# Patient Record
Sex: FEMALE | Race: WHITE | Employment: OTHER | URBAN - METROPOLITAN AREA
[De-identification: names, ages, dates, MRNs, and addresses within clinical notes are randomized per-mention and may not be internally consistent; named-entity substitution may affect disease eponyms.]

---

## 2019-04-10 ENCOUNTER — APPOINTMENT (OUTPATIENT)
Dept: CT IMAGING | Age: 84
DRG: 149 | End: 2019-04-10
Attending: EMERGENCY MEDICINE
Payer: MEDICARE

## 2019-04-10 ENCOUNTER — HOSPITAL ENCOUNTER (INPATIENT)
Age: 84
LOS: 2 days | Discharge: NURSING, REHAB OR LONG-TERM CARE | DRG: 149 | End: 2019-04-13
Attending: EMERGENCY MEDICINE | Admitting: HOSPITALIST
Payer: MEDICARE

## 2019-04-10 DIAGNOSIS — R27.0 ATAXIA: Primary | ICD-10-CM

## 2019-04-10 DIAGNOSIS — R42 DIZZINESS: ICD-10-CM

## 2019-04-10 LAB
ALBUMIN SERPL-MCNC: 3.4 G/DL (ref 3.5–5)
ALBUMIN/GLOB SERPL: 0.8 {RATIO} (ref 1.1–2.2)
ALP SERPL-CCNC: 56 U/L (ref 45–117)
ALT SERPL-CCNC: 18 U/L (ref 12–78)
ANION GAP SERPL CALC-SCNC: 4 MMOL/L (ref 5–15)
APPEARANCE UR: CLEAR
AST SERPL-CCNC: 24 U/L (ref 15–37)
BACTERIA URNS QL MICRO: NEGATIVE /HPF
BASOPHILS # BLD: 0.1 K/UL (ref 0–0.1)
BASOPHILS NFR BLD: 1 % (ref 0–1)
BILIRUB SERPL-MCNC: 0.3 MG/DL (ref 0.2–1)
BILIRUB UR QL: NEGATIVE
BUN SERPL-MCNC: 25 MG/DL (ref 6–20)
BUN/CREAT SERPL: 25 (ref 12–20)
CALCIUM SERPL-MCNC: 9.3 MG/DL (ref 8.5–10.1)
CAOX CRY URNS QL MICRO: ABNORMAL
CHLORIDE SERPL-SCNC: 102 MMOL/L (ref 97–108)
CO2 SERPL-SCNC: 30 MMOL/L (ref 21–32)
COLOR UR: ABNORMAL
CREAT SERPL-MCNC: 1 MG/DL (ref 0.55–1.02)
DIFFERENTIAL METHOD BLD: ABNORMAL
EOSINOPHIL # BLD: 0.1 K/UL (ref 0–0.4)
EOSINOPHIL NFR BLD: 1 % (ref 0–7)
EPITH CASTS URNS QL MICRO: ABNORMAL /LPF
ERYTHROCYTE [DISTWIDTH] IN BLOOD BY AUTOMATED COUNT: 19.7 % (ref 11.5–14.5)
GLOBULIN SER CALC-MCNC: 4.1 G/DL (ref 2–4)
GLUCOSE SERPL-MCNC: 143 MG/DL (ref 65–100)
GLUCOSE UR STRIP.AUTO-MCNC: NEGATIVE MG/DL
HCT VFR BLD AUTO: 32.3 % (ref 35–47)
HGB BLD-MCNC: 10.3 G/DL (ref 11.5–16)
HGB UR QL STRIP: NEGATIVE
IMM GRANULOCYTES # BLD AUTO: 0 K/UL (ref 0–0.04)
IMM GRANULOCYTES NFR BLD AUTO: 0 % (ref 0–0.5)
KETONES UR QL STRIP.AUTO: NEGATIVE MG/DL
LEUKOCYTE ESTERASE UR QL STRIP.AUTO: ABNORMAL
LIPASE SERPL-CCNC: 85 U/L (ref 73–393)
LYMPHOCYTES # BLD: 0.7 K/UL (ref 0.8–3.5)
LYMPHOCYTES NFR BLD: 7 % (ref 12–49)
MCH RBC QN AUTO: 27.1 PG (ref 26–34)
MCHC RBC AUTO-ENTMCNC: 31.9 G/DL (ref 30–36.5)
MCV RBC AUTO: 85 FL (ref 80–99)
MONOCYTES # BLD: 0.6 K/UL (ref 0–1)
MONOCYTES NFR BLD: 6 % (ref 5–13)
NEUTS SEG # BLD: 8.7 K/UL (ref 1.8–8)
NEUTS SEG NFR BLD: 85 % (ref 32–75)
NITRITE UR QL STRIP.AUTO: NEGATIVE
NRBC # BLD: 0 K/UL (ref 0–0.01)
NRBC BLD-RTO: 0 PER 100 WBC
PH UR STRIP: 7 [PH] (ref 5–8)
PLATELET # BLD AUTO: 212 K/UL (ref 150–400)
PMV BLD AUTO: 10.1 FL (ref 8.9–12.9)
POTASSIUM SERPL-SCNC: 4 MMOL/L (ref 3.5–5.1)
PROT SERPL-MCNC: 7.5 G/DL (ref 6.4–8.2)
PROT UR STRIP-MCNC: ABNORMAL MG/DL
RBC # BLD AUTO: 3.8 M/UL (ref 3.8–5.2)
RBC #/AREA URNS HPF: ABNORMAL /HPF (ref 0–5)
RBC MORPH BLD: ABNORMAL
SODIUM SERPL-SCNC: 136 MMOL/L (ref 136–145)
SP GR UR REFRACTOMETRY: 1.02 (ref 1–1.03)
UA: UC IF INDICATED,UAUC: ABNORMAL
UROBILINOGEN UR QL STRIP.AUTO: 0.2 EU/DL (ref 0.2–1)
WBC # BLD AUTO: 10.2 K/UL (ref 3.6–11)
WBC MORPH BLD: ABNORMAL
WBC URNS QL MICRO: ABNORMAL /HPF (ref 0–4)

## 2019-04-10 PROCEDURE — 83690 ASSAY OF LIPASE: CPT

## 2019-04-10 PROCEDURE — 74011250636 HC RX REV CODE- 250/636

## 2019-04-10 PROCEDURE — 93005 ELECTROCARDIOGRAM TRACING: CPT

## 2019-04-10 PROCEDURE — 74011250636 HC RX REV CODE- 250/636: Performed by: EMERGENCY MEDICINE

## 2019-04-10 PROCEDURE — 96374 THER/PROPH/DIAG INJ IV PUSH: CPT

## 2019-04-10 PROCEDURE — 96361 HYDRATE IV INFUSION ADD-ON: CPT

## 2019-04-10 PROCEDURE — 85025 COMPLETE CBC W/AUTO DIFF WBC: CPT

## 2019-04-10 PROCEDURE — 77030005563 HC CATH URETH INT MMGH -A

## 2019-04-10 PROCEDURE — 81001 URINALYSIS AUTO W/SCOPE: CPT

## 2019-04-10 PROCEDURE — 99285 EMERGENCY DEPT VISIT HI MDM: CPT

## 2019-04-10 PROCEDURE — 96375 TX/PRO/DX INJ NEW DRUG ADDON: CPT

## 2019-04-10 PROCEDURE — 36415 COLL VENOUS BLD VENIPUNCTURE: CPT

## 2019-04-10 PROCEDURE — 80053 COMPREHEN METABOLIC PANEL: CPT

## 2019-04-10 PROCEDURE — 70496 CT ANGIOGRAPHY HEAD: CPT

## 2019-04-10 PROCEDURE — 70450 CT HEAD/BRAIN W/O DYE: CPT

## 2019-04-10 PROCEDURE — 96376 TX/PRO/DX INJ SAME DRUG ADON: CPT

## 2019-04-10 RX ORDER — LORAZEPAM 2 MG/ML
0.5 INJECTION INTRAMUSCULAR
Status: COMPLETED | OUTPATIENT
Start: 2019-04-10 | End: 2019-04-10

## 2019-04-10 RX ORDER — ONDANSETRON 2 MG/ML
4 INJECTION INTRAMUSCULAR; INTRAVENOUS
Status: COMPLETED | OUTPATIENT
Start: 2019-04-10 | End: 2019-04-10

## 2019-04-10 RX ORDER — SODIUM CHLORIDE 0.9 % (FLUSH) 0.9 %
10 SYRINGE (ML) INJECTION
Status: COMPLETED | OUTPATIENT
Start: 2019-04-10 | End: 2019-04-11

## 2019-04-10 RX ORDER — ONDANSETRON 2 MG/ML
INJECTION INTRAMUSCULAR; INTRAVENOUS
Status: COMPLETED
Start: 2019-04-10 | End: 2019-04-10

## 2019-04-10 RX ADMIN — ONDANSETRON 4 MG: 2 INJECTION INTRAMUSCULAR; INTRAVENOUS at 21:51

## 2019-04-10 RX ADMIN — SODIUM CHLORIDE 1000 ML: 900 INJECTION, SOLUTION INTRAVENOUS at 21:10

## 2019-04-10 RX ADMIN — ONDANSETRON HYDROCHLORIDE 4 MG: 2 INJECTION, SOLUTION INTRAMUSCULAR; INTRAVENOUS at 21:51

## 2019-04-10 RX ADMIN — LORAZEPAM 0.5 MG: 2 INJECTION INTRAMUSCULAR; INTRAVENOUS at 21:10

## 2019-04-10 RX ADMIN — ONDANSETRON 4 MG: 2 INJECTION INTRAMUSCULAR; INTRAVENOUS at 19:19

## 2019-04-10 NOTE — ED NOTES
EMS called for patient with nausea vomiting and vertigo that started between 1600 and 1700 today. Family states speech is slow and weak, not slurred. EMS States patient is leaning to the left, family states this is new.  Patient denies pain, states the room is spinning, worse with eyes open .

## 2019-04-10 NOTE — ED NOTES
Assumed care of patient. Patient in no distress, although lethargic. AAOx3. Family at bedside, patient on cardiac monitor. Call bell within reach, side rails up. Transported to CT at this time

## 2019-04-11 ENCOUNTER — APPOINTMENT (OUTPATIENT)
Dept: MRI IMAGING | Age: 84
DRG: 149 | End: 2019-04-11
Attending: HOSPITALIST
Payer: MEDICARE

## 2019-04-11 PROBLEM — R42 DIZZINESS: Status: ACTIVE | Noted: 2019-04-11

## 2019-04-11 PROBLEM — R27.0 ATAXIA: Status: ACTIVE | Noted: 2019-04-11

## 2019-04-11 PROBLEM — R11.2 NAUSEA AND VOMITING: Status: ACTIVE | Noted: 2019-04-11

## 2019-04-11 LAB
ATRIAL RATE: 62 BPM
CALCULATED P AXIS, ECG09: 46 DEGREES
CALCULATED R AXIS, ECG10: 56 DEGREES
CALCULATED T AXIS, ECG11: 65 DEGREES
DIAGNOSIS, 93000: NORMAL
GLUCOSE BLD STRIP.AUTO-MCNC: 100 MG/DL (ref 65–100)
GLUCOSE BLD STRIP.AUTO-MCNC: 105 MG/DL (ref 65–100)
P-R INTERVAL, ECG05: 168 MS
Q-T INTERVAL, ECG07: 442 MS
QRS DURATION, ECG06: 90 MS
QTC CALCULATION (BEZET), ECG08: 448 MS
SERVICE CMNT-IMP: ABNORMAL
SERVICE CMNT-IMP: NORMAL
VENTRICULAR RATE, ECG03: 62 BPM

## 2019-04-11 PROCEDURE — 70551 MRI BRAIN STEM W/O DYE: CPT

## 2019-04-11 PROCEDURE — 97530 THERAPEUTIC ACTIVITIES: CPT

## 2019-04-11 PROCEDURE — 74011250637 HC RX REV CODE- 250/637: Performed by: HOSPITALIST

## 2019-04-11 PROCEDURE — 74011636637 HC RX REV CODE- 636/637: Performed by: HOSPITALIST

## 2019-04-11 PROCEDURE — 74011250636 HC RX REV CODE- 250/636: Performed by: PSYCHIATRY & NEUROLOGY

## 2019-04-11 PROCEDURE — 97530 THERAPEUTIC ACTIVITIES: CPT | Performed by: OCCUPATIONAL THERAPIST

## 2019-04-11 PROCEDURE — 74011636320 HC RX REV CODE- 636/320: Performed by: EMERGENCY MEDICINE

## 2019-04-11 PROCEDURE — 97116 GAIT TRAINING THERAPY: CPT

## 2019-04-11 PROCEDURE — 97166 OT EVAL MOD COMPLEX 45 MIN: CPT | Performed by: OCCUPATIONAL THERAPIST

## 2019-04-11 PROCEDURE — 74011250636 HC RX REV CODE- 250/636: Performed by: EMERGENCY MEDICINE

## 2019-04-11 PROCEDURE — 74011250636 HC RX REV CODE- 250/636: Performed by: HOSPITALIST

## 2019-04-11 PROCEDURE — 65660000000 HC RM CCU STEPDOWN

## 2019-04-11 PROCEDURE — 97162 PT EVAL MOD COMPLEX 30 MIN: CPT

## 2019-04-11 PROCEDURE — 97535 SELF CARE MNGMENT TRAINING: CPT | Performed by: OCCUPATIONAL THERAPIST

## 2019-04-11 PROCEDURE — 82962 GLUCOSE BLOOD TEST: CPT

## 2019-04-11 RX ORDER — ACETAMINOPHEN 650 MG/1
650 SUPPOSITORY RECTAL
Status: DISCONTINUED | OUTPATIENT
Start: 2019-04-11 | End: 2019-04-13 | Stop reason: HOSPADM

## 2019-04-11 RX ORDER — SODIUM CHLORIDE 9 MG/ML
75 INJECTION, SOLUTION INTRAVENOUS CONTINUOUS
Status: DISPENSED | OUTPATIENT
Start: 2019-04-11 | End: 2019-04-12

## 2019-04-11 RX ORDER — LANOLIN ALCOHOL/MO/W.PET/CERES
325 CREAM (GRAM) TOPICAL
Status: DISCONTINUED | OUTPATIENT
Start: 2019-04-12 | End: 2019-04-13 | Stop reason: HOSPADM

## 2019-04-11 RX ORDER — PANTOPRAZOLE SODIUM 40 MG/1
40 TABLET, DELAYED RELEASE ORAL DAILY
Status: DISCONTINUED | OUTPATIENT
Start: 2019-04-11 | End: 2019-04-13 | Stop reason: HOSPADM

## 2019-04-11 RX ORDER — SODIUM CHLORIDE 0.9 % (FLUSH) 0.9 %
5-40 SYRINGE (ML) INJECTION AS NEEDED
Status: DISCONTINUED | OUTPATIENT
Start: 2019-04-11 | End: 2019-04-13 | Stop reason: HOSPADM

## 2019-04-11 RX ORDER — SODIUM CHLORIDE 9 MG/ML
75 INJECTION, SOLUTION INTRAVENOUS CONTINUOUS
Status: DISCONTINUED | OUTPATIENT
Start: 2019-04-11 | End: 2019-04-13 | Stop reason: HOSPADM

## 2019-04-11 RX ORDER — ACETAMINOPHEN 325 MG/1
650 TABLET ORAL
Status: DISCONTINUED | OUTPATIENT
Start: 2019-04-11 | End: 2019-04-13 | Stop reason: HOSPADM

## 2019-04-11 RX ORDER — METHOTREXATE 2.5 MG/1
12.5 TABLET ORAL
COMMUNITY

## 2019-04-11 RX ORDER — MECLIZINE HCL 12.5 MG 12.5 MG/1
25 TABLET ORAL
Status: DISCONTINUED | OUTPATIENT
Start: 2019-04-11 | End: 2019-04-11

## 2019-04-11 RX ORDER — LABETALOL HYDROCHLORIDE 5 MG/ML
5 INJECTION, SOLUTION INTRAVENOUS
Status: DISCONTINUED | OUTPATIENT
Start: 2019-04-11 | End: 2019-04-13 | Stop reason: HOSPADM

## 2019-04-11 RX ORDER — ASCORBIC ACID 500 MG
500 TABLET ORAL DAILY
Status: DISCONTINUED | OUTPATIENT
Start: 2019-04-11 | End: 2019-04-13 | Stop reason: HOSPADM

## 2019-04-11 RX ORDER — LANOLIN ALCOHOL/MO/W.PET/CERES
325 CREAM (GRAM) TOPICAL
COMMUNITY

## 2019-04-11 RX ORDER — AMLODIPINE BESYLATE 5 MG/1
5 TABLET ORAL DAILY
COMMUNITY

## 2019-04-11 RX ORDER — MECLIZINE HCL 12.5 MG 12.5 MG/1
12.5 TABLET ORAL 3 TIMES DAILY
Status: DISCONTINUED | OUTPATIENT
Start: 2019-04-11 | End: 2019-04-13 | Stop reason: HOSPADM

## 2019-04-11 RX ORDER — FOLIC ACID 1 MG/1
1 TABLET ORAL DAILY
Status: DISCONTINUED | OUTPATIENT
Start: 2019-04-11 | End: 2019-04-13 | Stop reason: HOSPADM

## 2019-04-11 RX ORDER — PREDNISONE 5 MG/1
2.5 TABLET ORAL DAILY
Status: DISCONTINUED | OUTPATIENT
Start: 2019-04-11 | End: 2019-04-13 | Stop reason: HOSPADM

## 2019-04-11 RX ORDER — PREDNISONE 2.5 MG/1
2.5 TABLET ORAL DAILY
COMMUNITY

## 2019-04-11 RX ORDER — FERROUS SULFATE, DRIED 160(50) MG
2 TABLET, EXTENDED RELEASE ORAL 2 TIMES DAILY WITH MEALS
Status: DISCONTINUED | OUTPATIENT
Start: 2019-04-11 | End: 2019-04-13 | Stop reason: HOSPADM

## 2019-04-11 RX ORDER — SODIUM CHLORIDE 0.9 % (FLUSH) 0.9 %
5-40 SYRINGE (ML) INJECTION EVERY 8 HOURS
Status: DISCONTINUED | OUTPATIENT
Start: 2019-04-11 | End: 2019-04-13 | Stop reason: HOSPADM

## 2019-04-11 RX ORDER — ONDANSETRON 2 MG/ML
4 INJECTION INTRAMUSCULAR; INTRAVENOUS
Status: DISCONTINUED | OUTPATIENT
Start: 2019-04-11 | End: 2019-04-13 | Stop reason: HOSPADM

## 2019-04-11 RX ORDER — ASPIRIN 81 MG/1
81 TABLET ORAL DAILY
Status: DISCONTINUED | OUTPATIENT
Start: 2019-04-11 | End: 2019-04-13 | Stop reason: HOSPADM

## 2019-04-11 RX ADMIN — Medication 10 ML: at 15:39

## 2019-04-11 RX ADMIN — OXYCODONE HYDROCHLORIDE AND ACETAMINOPHEN 500 MG: 500 TABLET ORAL at 17:20

## 2019-04-11 RX ADMIN — ONDANSETRON 4 MG: 2 INJECTION INTRAMUSCULAR; INTRAVENOUS at 08:52

## 2019-04-11 RX ADMIN — Medication 10 ML: at 00:17

## 2019-04-11 RX ADMIN — OYSTER SHELL CALCIUM WITH VITAMIN D 2 TABLET: 500; 200 TABLET, FILM COATED ORAL at 17:20

## 2019-04-11 RX ADMIN — SODIUM CHLORIDE 75 ML/HR: 900 INJECTION, SOLUTION INTRAVENOUS at 20:38

## 2019-04-11 RX ADMIN — PREDNISONE 2.5 MG: 5 TABLET ORAL at 15:40

## 2019-04-11 RX ADMIN — MECLIZINE 12.5 MG: 12.5 TABLET ORAL at 17:20

## 2019-04-11 RX ADMIN — SODIUM CHLORIDE 75 ML/HR: 900 INJECTION, SOLUTION INTRAVENOUS at 01:31

## 2019-04-11 RX ADMIN — SODIUM CHLORIDE 75 ML/HR: 900 INJECTION, SOLUTION INTRAVENOUS at 08:47

## 2019-04-11 RX ADMIN — MULTIPLE VITAMINS W/ MINERALS TAB 1 TABLET: TAB at 15:39

## 2019-04-11 RX ADMIN — Medication 10 ML: at 21:18

## 2019-04-11 RX ADMIN — IOPAMIDOL 100 ML: 755 INJECTION, SOLUTION INTRAVENOUS at 00:17

## 2019-04-11 RX ADMIN — MULTIPLE VITAMINS W/ MINERALS TAB 1 TABLET: TAB at 17:24

## 2019-04-11 RX ADMIN — MECLIZINE 12.5 MG: 12.5 TABLET ORAL at 21:18

## 2019-04-11 RX ADMIN — ASPIRIN 81 MG: 81 TABLET, COATED ORAL at 08:45

## 2019-04-11 RX ADMIN — Medication 10 ML: at 08:52

## 2019-04-11 RX ADMIN — PANTOPRAZOLE SODIUM 40 MG: 40 TABLET, DELAYED RELEASE ORAL at 08:45

## 2019-04-11 NOTE — PROGRESS NOTES
Problem: Self Care Deficits Care Plan (Adult) Goal: *Acute Goals and Plan of Care (Insert Text) Description Occupational Therapy Goals: 
Initiated 4/11/2019 1. Patient will perform grooming standing with contact guard assist within 7 days. 2. Patient will perform donning of underwear and pants with minimal assistance within 7 days. 3. Patient will perform toileting with minimal assistance within 7 days. 4. Patient will perform bathing with minimal assist within 7 days. 5. Patient will improve dynamic sitting balance to supervision within 7 days. 6. Patient will transfer from toilet with contact guard assist using the least restrictive device and appropriate durable medical equipment within 7 days. Outcome: Progressing Towards Goal 
 
OCCUPATIONAL THERAPY EVALUATION Patient: Aga Steward (69 y.o. female) Date: 4/11/2019 Primary Diagnosis: Nausea and vomiting [R11.2] Dizziness [R42] Ataxia [R27.0] Precautions: fall ASSESSMENT : 
Based on the objective data described below, the patient presents with dizziness with head turns to the right and did have nystagmus when pt perform alicia hallpike and did have report of vertical double vision when laying on right side and looking down. MRI was negative but pt was noted to have a right droopy eyelid at times and family reported that pt slurs her words when she first wakes up. Heavy lean to the left seated and with all mobility attempts. Moderate assist x2 for ADL mobility and to mobilize around stretcher to toilet. Episode of urinary incontinence but pt was aware. Seated on commode pt needed consistent min to moderate assist to remain seated upright statically and moderate assist with attempting to wipe. Total assist for gown management and pt was unable to stand at sink to wash hands as pt had dry heaves. Pt is performing UB ADLS at a supervision to max assist level and lower body ADLS at a max/total assist level. Patient will benefit from skilled intervention to address the above impairments. Patient?s rehabilitation potential is considered to be Good Factors which may influence rehabilitation potential include: ? None noted ? Mental ability/status ? Medical condition ? Home/family situation and support systems ? Safety awareness ? Pain tolerance/management ? Other: PLAN : 
Recommendations and Planned Interventions: 
?               Self Care Training                  ? Therapeutic Activities ? Functional Mobility Training    ? Cognitive Retraining 
? Therapeutic Exercises           ? Endurance Activities ? Balance Training                   ? Neuromuscular Re-Education ? Visual/Perceptual Training     ? Home Safety Training 
? Patient Education                 ? Family Training/Education ? Other (comment): Frequency/Duration: Patient will be followed by occupational therapy 5 times a week to address goals. Discharge Recommendations: Inpatient Rehab Further Equipment Recommendations for Discharge: TBD SUBJECTIVE:  
Patient stated ? I don't mind working with you. You have been so helpful. ? OBJECTIVE DATA SUMMARY:  
HISTORY:  
Past Medical History:  
Diagnosis Date Cancer ([facility] [address] 08/2004  
 lymphoma Nausea & vomiting Rheumatoid arthritis(714.0) Past Surgical History:  
Procedure Laterality Date [address] HX CATARACT REMOVAL  8224,[id number] [address] HX HEENT  2005  
 rt tube in ear removed in 06 HX HERNIA REPAIR  0265  
 umbilical  
 HX ORTHOPAEDIC  6/84  
 hip replacement,lt HX ORTHOPAEDIC  1/88  
 bilateral knee replacements HX ORTHOPAEDIC  2008  
 bunionectomy rt. 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  
 
Home Situation Home Environment: Private residence Wheelchair Ramp: Yes One/Two Story Residence: One story Living Alone: Yes Support Systems: Child(aditya) Patient Expects to be Discharged to[de-identified] Private residence Current DME Used/Available at Home: Cane, straight, Walker(has but does not use) Tub or Shower Type: Shower Hand dominance: Right EXAMINATION OF PERFORMANCE DEFICITS: 
Cognitive/Behavioral Status: 
Neurologic State: Alert Orientation Level: Oriented X4 Cognition: Follows commands Perception: Appears intact Perseveration: No perseveration noted Safety/Judgement: Awareness of environment; Fall prevention;Home safety Hearing: Auditory Auditory Impairment: Hard of hearing, bilateral 
 
Vision/Perceptual:   
    
    
    
  
Diplopia: Yes(while laying on right side/looking down side by side) Acuity: (corrected with glasses) Range of Motion: 
AROM: Within functional limits(WFL LEs; B shoulders/hands with chronic limitations) Strength: 
Strength: Generally decreased, functional(LE equal bilaterally) Coordination: 
Coordination: Generally decreased, functional(severe arthritis in bilateral hands) Fine Motor Skills-Upper: Left Impaired;Right Impaired(due to deformities from arthitis bilateral hands) Gross Motor Skills-Upper: Left Impaired;Right Intact(left impaired due to arthritis) Tone & Sensation: 
Tone: Normal 
Sensation: Intact Balance: 
Sitting: Impaired Sitting - Static: Poor (constant support)(poor to fair but with L lean; corrects but w/ cues) Sitting - Dynamic: Poor (constant support)(severe L lean) Standing: Impaired Standing - Static: Poor(severe L lean and forward flexion) Standing - Dynamic : Poor(severe L lean and forward flexion) Functional Mobility and Transfers for ADLs: 
Bed Mobility: 
Supine to Sit: Moderate assistance;Assist x2 Sit to Supine: Moderate assistance;Assist x2 Transfers: 
Sit to Stand: Minimum assistance;Assist x2 Stand to Sit: Minimum assistance; Moderate assistance;Assist x2 Bed to Chair: Moderate assistance;Assist x2 Bathroom Mobility: Moderate assistance(x2 veering to the right, very ataxi) Toilet Transfer : Moderate assistance; Additional time;Assist x2(veering to the right) ADL Assessment: 
Feeding: Supervision Oral Facial Hygiene/Grooming: Minimum assistance Bathing: Maximum assistance Upper Body Dressing: Maximum assistance Lower Body Dressing: Total assistance Toileting: Maximum assistance; Total assistance ADL Intervention and task modifications: 
See assessment Cognitive Retraining Safety/Judgement: Awareness of environment; Fall prevention;Home safety Functional Measure: 
Barthel Index: 
Bathin Bladder: 5 Bowels: 10 
Groomin Dressin Feedin Mobility: 0 Stairs: 0 Toilet Use: 0 Transfer (Bed to Chair and Back): 5 Total: 30/100 Percentage of impairment  
0% 1-19% 20-39% 40-59% 60-79% 80-99% 100% Barthel Score 0-100 100 99-80 79-60 59-40 20-39 1-19 
 0 The Barthel ADL Index: Guidelines 1. The index should be used as a record of what a patient does, not as a record of what a patient could do. 2. The main aim is to establish degree of independence from any help, physical or verbal, however minor and for whatever reason. 3. The need for supervision renders the patient not independent. 4. A patient's performance should be established using the best available evidence. Asking the patient, friends/relatives and nurses are the usual sources, but direct observation and common sense are also important. However direct testing is not needed. 5. Usually the patient's performance over the preceding 24-48 hours is important, but occasionally longer periods will be relevant. 6. Middle categories imply that the patient supplies over 50 per cent of the effort. 7. Use of aids to be independent is allowed. Radha Diaz., Barthel, D.W. (3386). Functional evaluation: the Barthel Index. [facility] (14)2. Slime [place] alexis JAIMIE ReevesF, Adryan Luo., [facility] [place], [address] (1999). Measuring the change indisability after inpatient rehabilitation; comparison of the responsiveness of the Barthel Index and Functional [place] Measure. Journal of Neurology, Neurosurgery, and Psychiatry, 66(4), 973-427. Indu Michaels, N.J.A, SARAI Ley, & William Knight M.A. (2004.) Assessment of post-stroke quality of life in cost-effectiveness studies: The usefulness of the Barthel Index and the EuroQoL-5D. [facility], 13, 577-40 Occupational Therapy Evaluation Charge Determination History Examination Decision-Making MEDIUM Complexity : Expanded review of history including physical, cognitive and psychosocial  history  MEDIUM Complexity : 3-5 performance deficits relating to physical, cognitive , or psychosocial skils that result in activity limitations and / or participation restrictions HIGH Complexity : Patient presents with comorbidities that affect occupational performance. Signifigant modification of tasks or assistance (eg, physical or verbal) with assessment (s) is necessary to enable patient to complete evaluation Based on the above components, the patient evaluation is determined to be of the following complexity level: HIGH Pain: 
Pain Scale 1: Numeric (0 - 10) Pain Intensity 1: 0 Activity Tolerance:  
Please refer to the flowsheet for vital signs taken during this treatment. After treatment:  
? Patient left in no apparent distress sitting up in chair ? Patient left in no apparent distress in bed 
? Call bell left within reach ? Nursing notified ? Caregiver present ? Bed alarm activated COMMUNICATION/EDUCATION:  
The patient?s plan of care was discussed with: Physical Therapist, Registered Nurse and patient and her family . ? Home safety education was provided and the patient/caregiver indicated understanding. ? Patient/family have participated as able in goal setting and plan of care. ? Patient/family agree to work toward stated goals and plan of care. ? Patient understands intent and goals of therapy, but is neutral about his/her participation. ? Patient is unable to participate in goal setting and plan of care. This patient?s plan of care is appropriate for delegation to [facility]. Thank you for this referral. 
Donato Trammell OTR/L Time Calculation: 35 mins

## 2019-04-11 NOTE — PROGRESS NOTES
* No surgery found * 
* No surgery found * Bedside shift change report given to  Liban Maciel RN(oncoming nurse) by Divya Mendoza (offgoing nurse). Report included the following information SBAR and Kardex. Zone Phone:   [phone] Significant changes during shift:   
 
 
Patient Information Tretha Fuel 80 y.o. 
4/10/2019  6:41 PM by Kenneth Avila MD. Tretha Fuel was admitted from Home 
 
Problem List 
 
Patient Active Problem List  
 Diagnosis Date Noted  Dizziness 04/11/2019  Ataxia 04/11/2019  Nausea and vomiting 04/11/2019 Past Medical History:  
Diagnosis Date  Cancer ([facility] [address] 08/2004  
 lymphoma  Nausea & vomiting  Rheumatoid arthritis(714.0) Core Measures: CVA: No Yes CHF:No No 
PNA:No No 
 
Post Op Surgical (If Applicable):  
 
Number times ambulated in hallway past shift:  0 Number of times OOB to chair past shift:   0 
NG Tube: No 
Incentive Spirometer: No 
Drains: No   Volume  0 Dressing Present:  No 
Flatus:  No 
 
Activity Status: OOB to Chair No 
Ambulated this shift No  
Bed Rest Yes Supplemental O2: (If Applicable) NC No 
NRB No 
Venti-mask No 
On 0 Liters/min LINES AND DRAINS: 
Central Line? No Placement date 0 Reason Medically Necessary 0 
 
 
PICC LINE? No Placement date 0Reason Medically Necessary 0 Urinary Catheter? No Placement Date 0 Reason Medically Necessary 0 
 
 
DVT prophylaxis: DVT prophylaxis Med- Yes DVT prophylaxis SCD or PHUC- Yes Wounds: (If Applicable) Wounds- No 
 
Location 0 Patient Safety: 
 
Falls Score Total Score: 2 Safety Level_______ Bed Alarm On? Yes Sitter? No 
 
Plan for upcoming shift: needs PT/OT MRI, ECHO, NEURO CONSULT Discharge Plan: No 0 Active Consults: 
IP CONSULT TO NEUROLOGY

## 2019-04-11 NOTE — PROGRESS NOTES
Problem: Mobility Impaired (Adult and Pediatric) Goal: *Acute Goals and Plan of Care (Insert Text) Description Physical Therapy Goals Initiated 4/11/2019 1. Patient will move from supine to sit and sit to supine , scoot up and down and roll side to side in bed with minimal assistance/contact guard assist within 7 day(s). 2.  Patient will transfer from bed to chair and chair to bed with minimal assistance/contact guard assist using the least restrictive device within 7 day(s). 3.  Patient will perform sit to stand with minimal assistance/contact guard assist within 7 day(s). 4.  Patient will ambulate with minimal assistance/contact guard assist for 75 feet with the least restrictive device within 7 day(s). Outcome: Progressing Towards Goal 
 PHYSICAL THERAPY EVALUATION Patient: Luna Solis (50 y.o. female) Date: 4/11/2019 Primary Diagnosis: Nausea and vomiting [R11.2] Dizziness [R42] Ataxia [R27.0] Precautions: fall - poor balance sitting and standing with L lean ASSESSMENT : 
Based on the objective data described below, the patient presents with severe decline in function from her relatively independent baseline in all mobility, gait and balance. Cleared for session by RN. Pt lives alone in one story home with ramp entry. She is normally ambulatory without device independently and completes her own ADLs except for shoes and compression socks with which her son helps her in the mornings. Pt has remained active and states she did aquatic therapy/exercise up until she was [de-identified] yo. At this time, pt received in bed. Family notes some change to L side of face and slurred speech when pt wakes up from napping here in hospital. Pt is alert and Ox4. She c/o feeling off balance whenever she moves, does not expressly describe spinning dizziness. Pt is unable to tolerate more than a modified alicia hallpike but does report more sxs to the L.  Progressed through a modified epley with eval of bed mobility and pt describing more double vision than oscillopsia definitively describing 2 of objects not moving objects. She does show min nystagmus (horiz) with L [place]. Pt is mod A of 2 for her bed mobility. She shows poor sitting balance with moderate L lean and mod A to initially maintain sitting. She perceives L lean and is able to initiate correction but is unable to perceive if she has attained midline. Once cued, pt is able to maintain midline with CGA. She requires min A of 2 to stand but then mod A of 2 for any movement and amb with HHA with severe L lean and forward flexion. Pt also exhibiting intermittent L lean when sitting on toilet. Pt mod A of 2 amb back to bed with same lean and forward flexion and with nausea and dry heave. Pt returned to bed with mod A of 2. Call bell in reach and RN notified. Given severe and sudden nature of deficits well below her independent baseline along with need for extensive balance and vestibular therapy, would highly recommend inpatient rehab at d/c. Pt is motivated to return to independence and shows good potential to respond to intensive therapy. Patient will benefit from skilled intervention to address the above impairments. Patient?s rehabilitation potential is considered to be excellent Factors which may influence rehabilitation potential include:  
? None noted ? Mental ability/status ? Medical condition ? Home/family situation and support systems ? Safety awareness 
? Pain tolerance/management 
? Other: PLAN : 
Recommendations and Planned Interventions: 
?           Bed Mobility Training             ? Neuromuscular Re-Education ? Transfer Training                   ? Orthotic/Prosthetic Training 
? Gait Training                         ? Modalities ?           Therapeutic Exercises           ? Edema Management/Control ? Therapeutic Activities            ? Patient and Family Training/Education ? Other (comment): Frequency/Duration: Patient will be followed by physical therapy  5 times a week to address goals. Discharge Recommendations: Inpatient Rehab Further Equipment Recommendations for Discharge: TBD SUBJECTIVE:  
Patient stated ? Am I at the middle? .? OBJECTIVE DATA SUMMARY:  
HISTORY:   
Past Medical History:  
Diagnosis Date Cancer ([address] 08/2004  
 lymphoma Nausea & vomiting Rheumatoid arthritis(714.0) Past Surgical History:  
Procedure Laterality Date [address] [facility] HX CATARACT REMOVAL  7595,1835 [facility] HX HEENT  2005  
 rt tube in ear removed in 06 HX HERNIA REPAIR  6738  
 umbilical  
 HX ORTHOPAEDIC  6/84  
 hip replacement,lt HX ORTHOPAEDIC  1/88  
 bilateral knee replacements HX ORTHOPAEDIC  2008  
 bunionectomy rt. R Kavita [address] Prior Level of Function/Home Situation: Pt lives alone in one story home with ramp entry. She is normally ambulatory without device independently and completes her own ADLs except for shoes and compression socks with which her son helps her in the mornings. Pt has remained active and states she did aquatic therapy/exercise up until she was [de-identified] yo. Home Situation Home Environment: Private residence Wheelchair Ramp: Yes One/Two Story Residence: One story Living Alone: Yes Current DME Used/Available at Home: (life alert) Tub or Shower Type: Shower EXAMINATION/PRESENTATION/DECISION MAKING:  
Critical Behavior: 
Neurologic State: Alert Orientation Level: Oriented X4 Cognition: Follows commands Range Of Motion: 
AROM: Within functional limits(WFL LEs; B shoulders/hands with chronic limitations) Strength:   
 Strength: Generally decreased, functional(LE equal bilaterally) Tone & Sensation:  
Tone: Normal 
  
  
  
  
Sensation: Intact Coordination: 
Coordination: (decr B hands with RA changes; intact rep mvt at feet) Functional Mobility: 
Bed Mobility: 
  
Supine to Sit: Moderate assistance;Assist x2 Sit to Supine: Moderate assistance;Assist x2 Transfers: 
Sit to Stand: Minimum assistance;Assist x2 Stand to Sit: Minimum assistance; Moderate assistance;Assist x2 Bed to Chair: Moderate assistance;Assist x2 Balance:  
Sitting: Impaired Sitting - Static: Poor (constant support)(poor to fair but with L lean; corrects but w/ cues) Sitting - Dynamic: Poor (constant support)(severe L lean) Standing: Impaired Standing - Static: Poor(severe L lean and forward flexion) Standing - Dynamic : Poor(severe L lean and forward flexion) Ambulation/Gait Training: 
Distance (ft): 25 Feet (ft)(x2 to and from bathroom) Assistive Device: Gait belt(HHA of 2 with support at trunk) Ambulation - Level of Assistance: Moderate assistance;Assist x2 Gait Abnormalities: Decreased step clearance; Path deviations;Trunk sway increased(severe L lean and forward flexion) Base of Support: Widened Speed/Margaux: Slow;Pace decreased (<100 feet/min) Step Length: Right shortened;Left shortened Functional Measure: 
Claudette Griffon Balance Test: 
 
Sitting to Standin Standing Unsupported: 0 Sitting with Back Unsupported: 1(with cues) Standing to Sittin Transfers: 0 Standing Unsupported with Eyes Closed: 0 Standing Unsupported with Feet Together: 0 Reach Forward with Outstretched Arm: 0  Object: 0 Turn to Look Over Shoulders: 0 Turn 360 Degrees: 0 Alternate Foot on Step/Stool: 0 Standing Unsupported One Foot in Front: 0 Stand on One Le Total: 1 
 
 
56=Maximum possible score;  
0-20=High fall risk 21-40=Moderate fall risk 41-56=Low fall risk Physical Therapy Evaluation Charge Determination History Examination Presentation Decision-Making HIGH Complexity :3+ comorbidities / personal factors will impact the outcome/ POC  HIGH Complexity : 4+ Standardized tests and measures addressing body structure, function, activity limitation and / or participation in recreation  MEDIUM Complexity : Evolving with changing characteristics  MEDIUM Complexity : FOTO score of 26-74 Based on the above components, the patient evaluation is determined to be of the following complexity level: MEDIUM Pain: 
Pain Scale 1: Numeric (0 - 10) Pain Intensity 1: 0 Activity Tolerance:  
See above narrative Please refer to the flowsheet for vital signs taken during this treatment. After treatment:  
?         Patient left in no apparent distress sitting up in chair ? Patient left in no apparent distress in bed 
? Call bell left within reach ? Nursing notified ? Caregiver present ? Bed alarm activated COMMUNICATION/EDUCATION:  
The patient?s plan of care was discussed with: Occupational Therapist and Registered Nurse. ?         Fall prevention education was provided and the patient/caregiver indicated understanding. ? Patient/family have participated as able in goal setting and plan of care. ?         Patient/family agree to work toward stated goals and plan of care. ?         Patient understands intent and goals of therapy, but is neutral about his/her participation. ? Patient is unable to participate in goal setting and plan of care. Thank you for this referral. 
Jeremy Galvan, PT Time Calculation: 36 mins

## 2019-04-11 NOTE — ED NOTES
Unable to perform PO challenge or ambulate patient due to patients lethargy. Returned from CT scan, placed back on monitor

## 2019-04-11 NOTE — PROGRESS NOTES
Physical Therapy Received PT eval order. Pt seen for eval. PT with severe decline in function. Mod A of 2 for transfer and amb with severe L lean. +Double vision, consistent nausea and dry heaves. Highly recommend inpatient rehab on d/c. Full report to follow.  
 
Marletta Cheadle, PT

## 2019-04-11 NOTE — PROGRESS NOTES
Patient admitted from ED to room 3109. Patient resting in  bed comfortably head to toes assessment are as charted. Patient denies pain a this time. Pain assessment on going. Falls prevention maintained. Care plan reviewed and patient expressed understanding. Call light and belongings within reach. Will continue to monitor.

## 2019-04-11 NOTE — PROGRESS NOTES
Bedside shift change report given to [address] (oncoming nurse) by Hansel Fontaine RN (offgoing nurse). Report included the following information SBAR and Kardex.

## 2019-04-11 NOTE — H&P
Hospitalist Admission NoteNAME: Sami Robert :  1935 MRN:  [id number] Date/Time:  2019 7:04 AM 
 
Patient PCP: Lexie Mcelroy MD  
Rheum:  Dr. Christina Sis GI:  Dr. Shawanda Jack 
______________________________________________________________________ Assessment & Plan: 
Dizziness, truncal ataxia with leaning to left, nausea and vomiting 
--concerning for acute posterior stroke vs vertigo. Patient had nystagmus on ER eval, not appreciated now. 
--CT head and CTA head and neck negative. --start empiric coated aspirin. If MRI negative, will discontinue since she has had gastritis and LGIB. --empiric meclizine prn 
--pt/ot consult. Neurology consult, echo, A1c, lipid panel HTN 
--hold amlodipine to allow permissive HTN Severe RA 
--continue prednisone 2.5mg daily and MTX and folic acid Gastritis Hx LGIB Anemia 
--continue iron. Had EGD/colonoscopy a few months ago per patient. --continue PPI 
--hgb 10, was 8 few months ago There is no height or weight on file to calculate BMI. Code: full DVT prophylaxis:  SCD Surrogate decision maker:  Grant Elkins Subjective: CHIEF COMPLAINT:  Dizziness, leaning to left, thick tongued speech, n/v 
 
HISTORY OF PRESENT ILLNESS:    
Sami Robert is a 80 y.o.  female with severe RA, HTN onset 5pm with dizziness, gait instability, having to hold onto wall. Soon followed with severe nausea and vomiting, generalized weakness. Dizziness better if close eyes. Denies headache, focal weakness. Dizziness better now but still dizzy when sit up. No CP, SOB, abdominal pain, diarrhea. Denies any bleeding, stool always dark. We were asked to admit for work up and evaluation of the above problems. Past Medical History:  
Diagnosis Date  Cancer ([facility] [address] 2004  
 lymphoma  Nausea & vomiting  Rheumatoid arthritis(714.0) Past Surgical History:  
Procedure Laterality Date [facility]  HX CATARACT REMOVAL  [id number]  HX HEENT  2005  
 rt tube in ear removed in [address]  
 umbilical  
 HX ORTHOPAEDIC  6/84  
 hip replacement,lt  HX ORTHOPAEDIC  1/88  
 bilateral knee replacements  HX ORTHOPAEDIC  2008  
 bunionectomy [place]. [id number] [place] [address] Social History Tobacco Use  Smoking status: Never Smoker  Smokeless tobacco: Never Used Substance Use Topics  Alcohol use: No  
Lives alone, ambulate independent, used a walker when RA had flared up a few months ago Family History Problem Relation Age of Onset  Delayed Awakening Sister   
     took 2 days  Cancer Mother  Heart Disease Mother  Diabetes Father Allergies Allergen Reactions  Clinoril [Sulindac] Swelling Prior to Admission medications Medication Sig Start Date End Date Taking? Authorizing Provider  
hydrocodone-acetaminophen (VICODIN) 5-500 mg per tablet Take 1-2 Tabs by mouth every four (4) hours as needed for Pain. 1/25/11   Erika Presley MD  
folic acid (FOLVITE) 1 mg tablet Take 1 mg by mouth daily. Provider, Historical  
Calcium-Cholecalciferol, D3, (CALCIUM 600 WITH VITAMIN D3) 600 mg(1,500mg) -400 unit Chew Take 1 Tab by mouth two (2) times a day. 1/10/11   Provider, Historical  
ascorbic acid (VITAMIN C) 500 mg tablet Take 500 mg by mouth two (2) times a day. 1/10/11   Provider, Historical  
omega-3 fatty acids-vitamin e (FISH OIL) 1,000 mg Cap Take 1 Cap by mouth two (2) times a day. 1/10/11   Provider, Historical  
Ferrous Sulfate (IRON) 27 mg (Iron) Tab Take 1 Tab by mouth daily. 1/10/11   Provider, Historical  
aspirin 81 mg tablet Take 81 mg by mouth daily. 1/10/11   Provider, Historical  
MULTIVITS W-FE,OTHER MIN (CENTRUM PO) Take 1 Tab by mouth daily.  1/10/11   Provider, Historical  
 fiber Cap Take 1 Tab by mouth two (2) times a day. 1/10/11   Provider, Historical  
 
REVIEW OF SYSTEMS:  POSITIVE= Bold. Negative = normal text General:  fever, chills, sweats, generalized weakness, weight loss/gain, loss of appetite Eyes:  blurred vision, eye pain, loss of vision, diplopia Ear Nose and Throat:  rhinorrhea, pharyngitis Respiratory:   cough, sputum production, SOB, wheezing, MCCONNELL, pleuritic pain 
Cardiology:  chest pain, palpitations, orthopnea, PND, edema, syncope Gastrointestinal:  abdominal pain, N/V, dysphagia, diarrhea, constipation, bleeding with bloody stool x 1 week a few months ago Genitourinary:  frequency, urgency, dysuria, hematuria, incontinence Muskuloskeletal :  arthralgia, myalgia Hematology:  easy bruising, bleeding, lymphadenopathy Dermatological:  rash, ulceration, pruritis Endocrine:  hot flashes or polydipsia Neurological:  headache, dizziness, confusion, focal weakness, paresthesia, memory loss, gait disturbance Psychological: anxiety, depression, agitation Objective: VITALS:   
Visit Vitals /44 Pulse 64 Temp 97.5 °F (36.4 °C) Resp 9 SpO2 96% Temp (24hrs), Av.7 °F (36.5 °C), Min:97.5 °F (36.4 °C), Max:97.8 °F (36.6 °C) There is no height or weight on file to calculate BMI. PHYSICAL EXAM: 
 
General:    Alert, cooperative, no distress, appears stated age. HEENT: Atraumatic, anicteric sclerae, pink conjunctivae No oral ulcers, mucosa dry, cavity in left upper back molar, throat clear. Hearing intact. Neck:  Supple, symmetrical,  thyroid: non tender Lungs:   Clear to auscultation bilaterally. No Wheezing or Rhonchi. No rales. Chest wall:  No tenderness  No Accessory muscle use. Heart:   Regular  rhythm,  No  murmur   No gallop. No edema. Abdomen:   Soft, non-tender. Not distended. Bowel sounds normal. No masses Extremities: No cyanosis. No clubbing. Severe arthritis deformities fingers and hands and left toes Skin:     Not pale Not Jaundiced  No rashes Psych:  Good insight. Not depressed. Not anxious or agitated. Neurologic: EOMs intact. No facial asymmetry. No aphasia or slurred speech. Left arm slightly weaker than right, no pronator drift. Legs 5/5. Finger to nose intact, Alert and oriented X 3. Peripheral pulse: Bilateral, DP, 2+ Capillary refill:  normal 
 
IMAGING RESULTS: 
 [x]       I have personally reviewed the actual   []     CXR  []     CT scan CXR: 
CT : 
EKG: SR 62, no ST T abnormality 
 ________________________________________________________________________ Care Plan discussed with: 
  Comments Patient y Family  y Bertrum Nell and granddaughter [place] Cava bedside RN Care Manager Consultant:     
________________________________________________________________________ Prophylaxis: 
GI PPI  
DVT SCD  
________________________________________________________________________ Recommended Disposition:  
Home with Family y HH/PT/OT/RN y  
SNF/LTC   
HERB   
________________________________________________________________________ Code Status: 
Full Code y  
DNR/DNI   
________________________________________________________________________ TOTAL TIME: 60 minutes 
 
 
______________________________________________________________________ Rickie Witt MD 
 
 
Procedures: see electronic medical records for all procedures/Xrays and details which were not copied into this note but were reviewed prior to creation of Plan. LAB DATA REVIEWED:   
Recent Results (from the past 24 hour(s)) EKG, 12 LEAD, INITIAL Collection Time: 04/10/19  6:52 PM  
Result Value Ref Range Ventricular Rate 62 BPM  
 Atrial Rate 62 BPM  
 P-R Interval 168 ms QRS Duration 90 ms Q-T Interval 442 ms QTC Calculation (Bezet) 448 ms Calculated P Axis 46 degrees Calculated R Axis 56 degrees Calculated T Axis 65 degrees Diagnosis Normal sinus rhythm Normal ECG 
 When compared with ECG of 10-GIANNA-2011 12:10, 
Questionable change in QRS axis CBC WITH AUTOMATED DIFF Collection Time: 04/10/19  7:20 PM  
Result Value Ref Range WBC 10.2 3.6 - 11.0 K/uL  
 RBC 3.80 3.80 - 5.20 M/uL  
 HGB 10.3 (L) 11.5 - 16.0 g/dL HCT 32.3 (L) 35.0 - 47.0 % MCV 85.0 80.0 - 99.0 FL  
 MCH 27.1 26.0 - 34.0 PG  
 MCHC 31.9 30.0 - 36.5 g/dL  
 RDW 19.7 (H) 11.5 - 14.5 % PLATELET 434 652 - 488 K/uL MPV 10.1 8.9 - 12.9 FL  
 NRBC 0.0 0  WBC ABSOLUTE NRBC 0.00 0.00 - 0.01 K/uL NEUTROPHILS 85 (H) 32 - 75 % LYMPHOCYTES 7 (L) 12 - 49 % MONOCYTES 6 5 - 13 % EOSINOPHILS 1 0 - 7 % BASOPHILS 1 0 - 1 % IMMATURE GRANULOCYTES 0 0.0 - 0.5 % ABS. NEUTROPHILS 8.7 (H) 1.8 - 8.0 K/UL  
 ABS. LYMPHOCYTES 0.7 (L) 0.8 - 3.5 K/UL  
 ABS. MONOCYTES 0.6 0.0 - 1.0 K/UL  
 ABS. EOSINOPHILS 0.1 0.0 - 0.4 K/UL  
 ABS. BASOPHILS 0.1 0.0 - 0.1 K/UL  
 ABS. IMM. GRANS. 0.0 0.00 - 0.04 K/UL  
 DF SMEAR SCANNED    
 RBC COMMENTS ANISOCYTOSIS 
1+ WBC COMMENTS TOXIC GRANULATION    
METABOLIC PANEL, COMPREHENSIVE Collection Time: 04/10/19  7:20 PM  
Result Value Ref Range Sodium 136 136 - 145 mmol/L Potassium 4.0 3.5 - 5.1 mmol/L Chloride 102 97 - 108 mmol/L  
 CO2 30 21 - 32 mmol/L Anion gap 4 (L) 5 - 15 mmol/L Glucose 143 (H) 65 - 100 mg/dL BUN 25 (H) 6 - 20 MG/DL Creatinine 1.00 0.55 - 1.02 MG/DL  
 BUN/Creatinine ratio 25 (H) 12 - 20 GFR est AA >60 >60 ml/min/1.73m2 GFR est non-AA 53 (L) >60 ml/min/1.73m2 Calcium 9.3 8.5 - 10.1 MG/DL Bilirubin, total 0.3 0.2 - 1.0 MG/DL  
 ALT (SGPT) 18 12 - 78 U/L  
 AST (SGOT) 24 15 - 37 U/L Alk. phosphatase 56 45 - 117 U/L Protein, total 7.5 6.4 - 8.2 g/dL Albumin 3.4 (L) 3.5 - 5.0 g/dL Globulin 4.1 (H) 2.0 - 4.0 g/dL A-G Ratio 0.8 (L) 1.1 - 2.2 LIPASE Collection Time: 04/10/19  7:20 PM  
Result Value Ref Range  Lipase 85 73 - 393 U/L  
URINALYSIS W/ REFLEX CULTURE  
 Collection Time: 04/10/19 10:19 PM  
Result Value Ref Range Color YELLOW/STRAW Appearance CLEAR CLEAR Specific gravity 1.016 1.003 - 1.030    
 pH (UA) 7.0 5.0 - 8.0 Protein TRACE (A) NEG mg/dL Glucose NEGATIVE  NEG mg/dL Ketone NEGATIVE  NEG mg/dL Bilirubin NEGATIVE  NEG Blood NEGATIVE  NEG Urobilinogen 0.2 0.2 - 1.0 EU/dL Nitrites NEGATIVE  NEG Leukocyte Esterase SMALL (A) NEG    
 WBC 0-4 0 - 4 /hpf  
 RBC 0-5 0 - 5 /hpf Epithelial cells FEW FEW /lpf  Bacteria NEGATIVE  NEG /hpf  
 UA:UC IF INDICATED CULTURE NOT INDICATED BY UA RESULT CNI    
 CA Oxalate crystals FEW (A) NEG

## 2019-04-11 NOTE — CONSULTS
NEUROLOGY NOTE     Chief Complaint   Patient presents with    Nausea     started suddenly at 1630 today    Vomiting     started with nausea and vertigo suddenly at 1630       Reason for Consult  I have been asked to see the patient in neurological consultation by Stacye Murry MD to render advice and opinion regarding possible stroke    HPI  Aga Steward is a 80 y.o. female who presents to the hospital because of vertigo. Pt states that she was just walking out of the bathroom and did have sudden onset dizziness. It was hard for her to maintain balance. She had to hold on to the fall to maintain balance. She did call her daughter in law and did not have any slurred speech. When she sat down, she started having nausea and retching. No numbness or any focal weakness. She did have MRI head and it is normal. Her dizziness is better here.      ROS  A ten system review of constitutional, cardiovascular, respiratory, musculoskeletal, endocrine, skin, SHEENT, genitourinary, psychiatric and neurologic systems was obtained and is unremarkable except as stated in HPI     PMH  Past Medical History:   Diagnosis Date    Cancer ([facility] [address] 08/2004    lymphoma    Nausea & vomiting     Rheumatoid arthritis(714.0)        FH  Family History   Problem Relation Age of Onset    Delayed Awakening Sister         took 2 days    Cancer Mother     Heart Disease Mother     Diabetes Father        SH  Social History     Socioeconomic History    Marital status:      Spouse name: Not on file    Number of children: Not on file    Years of education: Not on file    Highest education level: Not on file   Tobacco Use    Smoking status: Never Smoker    Smokeless tobacco: Never Used   Substance and Sexual Activity    Alcohol use: No    Drug use: Yes     Types: Prescription, OTC       ALLERGIES  Allergies   Allergen Reactions    Clinoril [Sulindac] Swelling       PHYSICAL EXAMINATION:   Patient Vitals for the past 24 hrs: Temp Pulse Resp BP SpO2   04/11/19 1200 98 °F (36.7 °C) 68 16 135/64    04/11/19 1145    133/63    04/11/19 1000  66 12 122/52 97 %   04/11/19 0930  65 15 107/43 94 %   04/11/19 0830  66 12 125/52 94 %   04/11/19 0700 98.3 °F (36.8 °C) (!) 59 14 111/46 95 %   04/11/19 0630  61 15 104/45 95 %   04/11/19 0600  63 15 124/53 95 %   04/11/19 0552 97.5 °F (36.4 °C) 64 9 106/44 96 %   04/11/19 0530  65 15 106/44 95 %   04/11/19 0500  60 23 105/45 98 %   04/11/19 0430  61 14 118/47 97 %   04/11/19 0400  62 13 105/45 98 %   04/11/19 0330  64 15 103/50 90 %   04/11/19 0300  60 14 112/45 98 %   04/11/19 0230  (!) 58 15 96/64 96 %   04/11/19 0200  (!) 58 14 99/43 97 %   04/11/19 0130  60 14 106/47 96 %   04/11/19 0100  68 14 136/60 98 %   04/11/19 0030  68 18 131/66 99 %   04/10/19 2307 97.7 °F (36.5 °C) 61 15 98/46 96 %   04/10/19 2300  62 14 98/46 96 %   04/10/19 2230  65 14 105/40 97 %   04/10/19 2200  71 19 115/47 98 %   04/10/19 2130  61 15 (!) 100/39 96 %   04/10/19 2100  (!) 58 16 106/42 96 %   04/10/19 2030  78 15 154/60 97 %   04/10/19 1902 97.8 °F (36.6 °C) 65 20 162/72    04/10/19 1900  79 19 162/72 97 %        General:   General appearance: Pt is in no acute distress   Distal pulses are preserved  Fundoscopic exam: attempted    Neurological Examination:   Mental Status:  AAO x3. Speech is fluent. Follows commands, has normal fund of knowledge, attention, short term recall, comprehension and insight. Cranial Nerves: Visual fields are full. PERRL, Extraocular movements are full. Facial sensation intact. Facial movement intact. Hearing intact to conversation. Palate elevates symmetrically. Shoulder shrug symmetric. Tongue midline. Motor: Strength is 5/5 in all 4 ext. Normal tone. No atrophy. Sensation: Normal to light touch    Reflexes: DTRs 1+ throughout. Plantar responses downgoing.      Coordination/Cerebellar: Intact to finger-nose-finger     Gait: deferred    Skin: No significant bruising or lacerations. LAB DATA REVIEWED:    Recent Results (from the past 24 hour(s))   EKG, 12 LEAD, INITIAL    Collection Time: 04/10/19  6:52 PM   Result Value Ref Range    Ventricular Rate 62 BPM    Atrial Rate 62 BPM    P-R Interval 168 ms    QRS Duration 90 ms    Q-T Interval 442 ms    QTC Calculation (Bezet) 448 ms    Calculated P Axis 46 degrees    Calculated R Axis 56 degrees    Calculated T Axis 65 degrees    Diagnosis       Normal sinus rhythm  When compared with ECG of 10-GIANNA-2011 12:10,  No significant change  Confirmed by Jian Donovan ([id number]) on 4/11/2019 11:07:36 AM     CBC WITH AUTOMATED DIFF    Collection Time: 04/10/19  7:20 PM   Result Value Ref Range    WBC 10.2 3.6 - 11.0 K/uL    RBC 3.80 3.80 - 5.20 M/uL    HGB 10.3 (L) 11.5 - 16.0 g/dL    HCT 32.3 (L) 35.0 - 47.0 %    MCV 85.0 80.0 - 99.0 FL    MCH 27.1 26.0 - 34.0 PG    MCHC 31.9 30.0 - 36.5 g/dL    RDW 19.7 (H) 11.5 - 14.5 %    PLATELET 580 155 - 685 K/uL    MPV 10.1 8.9 - 12.9 FL    NRBC 0.0 0  WBC    ABSOLUTE NRBC 0.00 0.00 - 0.01 K/uL    NEUTROPHILS 85 (H) 32 - 75 %    LYMPHOCYTES 7 (L) 12 - 49 %    MONOCYTES 6 5 - 13 %    EOSINOPHILS 1 0 - 7 %    BASOPHILS 1 0 - 1 %    IMMATURE GRANULOCYTES 0 0.0 - 0.5 %    ABS. NEUTROPHILS 8.7 (H) 1.8 - 8.0 K/UL    ABS. LYMPHOCYTES 0.7 (L) 0.8 - 3.5 K/UL    ABS. MONOCYTES 0.6 0.0 - 1.0 K/UL    ABS. EOSINOPHILS 0.1 0.0 - 0.4 K/UL    ABS. BASOPHILS 0.1 0.0 - 0.1 K/UL    ABS. IMM.  GRANS. 0.0 0.00 - 0.04 K/UL    DF SMEAR SCANNED      RBC COMMENTS ANISOCYTOSIS  1+        WBC COMMENTS TOXIC GRANULATION     METABOLIC PANEL, COMPREHENSIVE    Collection Time: 04/10/19  7:20 PM   Result Value Ref Range    Sodium 136 136 - 145 mmol/L    Potassium 4.0 3.5 - 5.1 mmol/L    Chloride 102 97 - 108 mmol/L    CO2 30 21 - 32 mmol/L    Anion gap 4 (L) 5 - 15 mmol/L    Glucose 143 (H) 65 - 100 mg/dL    BUN 25 (H) 6 - 20 MG/DL    Creatinine 1.00 0.55 - 1.02 MG/DL    BUN/Creatinine ratio 25 (H) 12 - 20      GFR est AA >60 >60 ml/min/1.73m2    GFR est non-AA 53 (L) >60 ml/min/1.73m2    Calcium 9.3 8.5 - 10.1 MG/DL    Bilirubin, total 0.3 0.2 - 1.0 MG/DL    ALT (SGPT) 18 12 - 78 U/L    AST (SGOT) 24 15 - 37 U/L    Alk. phosphatase 56 45 - 117 U/L    Protein, total 7.5 6.4 - 8.2 g/dL    Albumin 3.4 (L) 3.5 - 5.0 g/dL    Globulin 4.1 (H) 2.0 - 4.0 g/dL    A-G Ratio 0.8 (L) 1.1 - 2.2     LIPASE    Collection Time: 04/10/19  7:20 PM   Result Value Ref Range    Lipase 85 73 - 393 U/L   URINALYSIS W/ REFLEX CULTURE    Collection Time: 04/10/19 10:19 PM   Result Value Ref Range    Color YELLOW/STRAW      Appearance CLEAR CLEAR      Specific gravity 1.016 1.003 - 1.030      pH (UA) 7.0 5.0 - 8.0      Protein TRACE (A) NEG mg/dL    Glucose NEGATIVE  NEG mg/dL    Ketone NEGATIVE  NEG mg/dL    Bilirubin NEGATIVE  NEG      Blood NEGATIVE  NEG      Urobilinogen 0.2 0.2 - 1.0 EU/dL    Nitrites NEGATIVE  NEG      Leukocyte Esterase SMALL (A) NEG      WBC 0-4 0 - 4 /hpf    RBC 0-5 0 - 5 /hpf    Epithelial cells FEW FEW /lpf    Bacteria NEGATIVE  NEG /hpf    UA:UC IF INDICATED CULTURE NOT INDICATED BY UA RESULT CNI      CA Oxalate crystals FEW (A) NEG          HOME MEDS  Prior to Admission Medications   Prescriptions Last Dose Informant Patient Reported? Taking? Calcium-Cholecalciferol, D3, (CALCIUM 600 WITH VITAMIN D3) 600 mg(1,500mg) -400 unit Chew   Yes Yes   Sig: Take 1 Tab by mouth two (2) times a day. MULTIVITS W-FE,OTHER MIN (CENTRUM PO)   Yes Yes   Sig: Take 1 Tab by mouth daily. amLODIPine (NORVASC) 5 mg tablet   Yes Yes   Sig: Take 5 mg by mouth daily. ascorbic acid (VITAMIN C) 500 mg tablet   Yes Yes   Sig: Take 500 mg by mouth daily. denosumab (PROLIA) 60 mg/mL injection   Yes Yes   Si mg by SubCUTAneous route. ferrous sulfate (IRON) 325 mg (65 mg iron) tablet   Yes Yes   Sig: Take 325 mg by mouth Daily (before breakfast). folic acid (FOLVITE) 1 mg tablet   Yes Yes   Sig: Take 1 mg by mouth daily. methotrexate (RHEUMATREX) 2.5 mg tablet   Yes Yes   Sig: Take 12.5 mg by mouth Every Saturday. omeprazole magnesium (PRILOSEC PO)   Yes Yes   Sig: Take  by mouth daily. predniSONE (DELTASONE) 2.5 mg tablet 4/10/2019 at Unknown time  Yes Yes   Sig: Take 2.5 mg by mouth daily. vit a,c & e-lutein-minerals (OCUVITE) tablet   Yes Yes   Sig: Take 1 Tab by mouth daily. Facility-Administered Medications: None       CURRENT MEDS  Current Facility-Administered Medications   Medication Dose Route Frequency    0.9% sodium chloride infusion  75 mL/hr IntraVENous CONTINUOUS    sodium chloride (NS) flush 5-40 mL  5-40 mL IntraVENous Q8H    0.9% sodium chloride infusion  75 mL/hr IntraVENous CONTINUOUS    aspirin delayed-release tablet 81 mg  81 mg Oral DAILY    pantoprazole (PROTONIX) tablet 40 mg  40 mg Oral DAILY    ascorbic acid (vitamin C) (VITAMIN C) tablet 500 mg  500 mg Oral DAILY    calcium-vitamin D (OS-ABELARDO) 500 mg-200 unit tablet  2 Tab Oral BID WITH MEALS    [START ON 4/12/2019] ferrous sulfate tablet 325 mg  325 mg Oral ACB    folic acid (FOLVITE) tablet 1 mg  1 mg Oral DAILY    predniSONE (DELTASONE) tablet 2.5 mg  2.5 mg Oral DAILY    vitamins  A,C,E-zinc-copper (I-REHAN PROTECT) tablet 1 Tab  1 Tab Oral DAILY    multivitamin, tx-iron-ca-min (THERA-M w/ IRON) tablet 1 Tab  1 Tab Oral DAILY       Stroke workup    MRI Brain  No acute intracranial abnormality demonstrated. CTA Head and neck  Marked vessel tortuosity. No occlusion     TTE:   Pending    Stroke labs:  HgBA1c    No results found for: HBA1C, PXP1YLLT  LDL   No results found for: LDL, LDLC, DLDLP    IMPRESSION:  Ursula Rivero is a 80 y.o. female who presents with vertigo. MRI brain is negative. Suspect peripheral vertigo. Recommend meclizine 12.5mg po tid and outpatient vestibular rehab. RECOMMENDATIONS:  - Meclizine 25 mg po tid  - Outpatient vestibular rehab    Call with questions.        Tiffanie Layton, MD  Neurologist

## 2019-04-11 NOTE — ED PROVIDER NOTES
EMERGENCY DEPARTMENT HISTORY AND PHYSICAL EXAM 
 
 
Date: 4/10/2019 Patient Name: Nilson Melo History of Presenting Illness Chief Complaint Patient presents with  Nausea  
  started suddenly at 1630 today  Vomiting  
  started with nausea and vertigo suddenly at 1630 History Provided By: Patient, Patient's  and Patient's Daughter HPI: Nilson Melo, 80 y.o. female with PMHx significant for rheumatoid arthritis on methotrexate, presents via EMS to the ED with cc of dizziness and nausea. At the time of my evaluation, patient's dizziness has resolved, however she is persistently nauseous. Family reports that this occurred sometime before 5, they report that the patient crawled inside to call family for help. When they arrived at her house she was complaining of dizziness and then began to vomit. He states that she has had prior \"dizzy spells\" however have not been as severe. Patient denies any headache, chest pain, one-sided weakness, abdominal pain, diarrhea, dysuria. PCP: Lizett Armenta MD 
 
There are no other complaints, changes, or physical findings at this time. Current Outpatient Medications Medication Sig Dispense Refill  hydrocodone-acetaminophen (VICODIN) 5-500 mg per tablet Take 1-2 Tabs by mouth every four (4) hours as needed for Pain. 12 Tab 0  
 folic acid (FOLVITE) 1 mg tablet Take 1 mg by mouth daily.  Calcium-Cholecalciferol, D3, (CALCIUM 600 WITH VITAMIN D3) 600 mg(1,500mg) -400 unit Chew Take 1 Tab by mouth two (2) times a day.  ascorbic acid (VITAMIN C) 500 mg tablet Take 500 mg by mouth two (2) times a day.  omega-3 fatty acids-vitamin e (FISH OIL) 1,000 mg Cap Take 1 Cap by mouth two (2) times a day.  Ferrous Sulfate (IRON) 27 mg (Iron) Tab Take 1 Tab by mouth daily.  aspirin 81 mg tablet Take 81 mg by mouth daily.  MULTIVITS W-FE,OTHER MIN (CENTRUM PO) Take 1 Tab by mouth daily.  fiber Cap Take 1 Tab by mouth two (2) times a day. Past History Past Medical History: 
Past Medical History:  
Diagnosis Date  Cancer ([facility] [address] 08/2004  
 lymphoma  Nausea & vomiting  Rheumatoid arthritis(714.0) Past Surgical History: 
Past Surgical History:  
Procedure Laterality Date [address]  HX CATARACT REMOVAL  [id number] [address]  HX HEENT  2005  
 rt tube in ear removed in [address]  
 umbilical  
 HX ORTHOPAEDIC  6/84  
 hip replacement,lt  HX ORTHOPAEDIC  1/88  
 bilateral knee replacements  HX ORTHOPAEDIC  2008  
 [place]ectomy [place]. [id number]  [address] Family History: 
Family History Problem Relation Age of Onset  Delayed Awakening Sister   
     took 2 days  Cancer Mother  Heart Disease Mother  Diabetes Father Social History: 
Social History Tobacco Use  Smoking status: Never Smoker  Smokeless tobacco: Never Used Substance Use Topics  Alcohol use: No  
 Drug use: Yes Types: Prescription, OTC Allergies: Allergies Allergen Reactions  Clinoril [Sulindac] Swelling Review of Systems Review of Systems Constitutional: Negative for chills and fever. HENT: Negative for congestion, rhinorrhea and sore throat. Respiratory: Negative for cough and shortness of breath. Cardiovascular: Negative for chest pain. Gastrointestinal: Positive for nausea and vomiting. Negative for abdominal pain. Genitourinary: Negative for dysuria and urgency. Skin: Negative for rash. Neurological: Positive for dizziness. Negative for light-headedness and headaches. All other systems reviewed and are negative. Physical Exam  
Physical Exam  
Constitutional: She is oriented to person, place, and time. She appears well-developed and well-nourished. She appears distressed. vomiting HENT:  
Head: Normocephalic and atraumatic. Eyes: Pupils are equal, round, and reactive to light. Conjunctivae are normal.  
Fatigable nystagmus with rightward gaze Neck: Normal range of motion. Cardiovascular: Normal rate, regular rhythm and intact distal pulses. Pulmonary/Chest: Effort normal and breath sounds normal. No stridor. No respiratory distress. Abdominal: Soft. She exhibits no distension. There is no tenderness. Musculoskeletal: Normal range of motion. Neurological: She is alert and oriented to person, place, and time. She has normal strength. No cranial nerve deficit or sensory deficit. No pronator drift Skin: Skin is warm and dry. Psychiatric: She has a normal mood and affect. Nursing note and vitals reviewed. Diagnostic Study Results Labs - Recent Results (from the past 12 hour(s)) EKG, 12 LEAD, INITIAL Collection Time: 04/10/19  6:52 PM  
Result Value Ref Range Ventricular Rate 62 BPM  
 Atrial Rate 62 BPM  
 P-R Interval 168 ms QRS Duration 90 ms Q-T Interval 442 ms QTC Calculation (Bezet) 448 ms Calculated P Axis 46 degrees Calculated R Axis 56 degrees Calculated T Axis 65 degrees Diagnosis Normal sinus rhythm Normal ECG When compared with ECG of 10-GIANNA-2011 12:10, 
Questionable change in QRS axis CBC WITH AUTOMATED DIFF Collection Time: 04/10/19  7:20 PM  
Result Value Ref Range WBC 10.2 3.6 - 11.0 K/uL  
 RBC 3.80 3.80 - 5.20 M/uL  
 HGB 10.3 (L) 11.5 - 16.0 g/dL HCT 32.3 (L) 35.0 - 47.0 % MCV 85.0 80.0 - 99.0 FL  
 MCH 27.1 26.0 - 34.0 PG  
 MCHC 31.9 30.0 - 36.5 g/dL  
 RDW 19.7 (H) 11.5 - 14.5 % PLATELET 879 691 - 198 K/uL MPV 10.1 8.9 - 12.9 FL  
 NRBC 0.0 0  WBC ABSOLUTE NRBC 0.00 0.00 - 0.01 K/uL NEUTROPHILS 85 (H) 32 - 75 % LYMPHOCYTES 7 (L) 12 - 49 % MONOCYTES 6 5 - 13 % EOSINOPHILS 1 0 - 7 % BASOPHILS 1 0 - 1 % IMMATURE GRANULOCYTES 0 0.0 - 0.5 % ABS. NEUTROPHILS 8.7 (H) 1.8 - 8.0 K/UL ABS. LYMPHOCYTES 0.7 (L) 0.8 - 3.5 K/UL  
 ABS. MONOCYTES 0.6 0.0 - 1.0 K/UL  
 ABS. EOSINOPHILS 0.1 0.0 - 0.4 K/UL  
 ABS. BASOPHILS 0.1 0.0 - 0.1 K/UL  
 ABS. IMM. GRANS. 0.0 0.00 - 0.04 K/UL  
 DF SMEAR SCANNED    
 RBC COMMENTS ANISOCYTOSIS 
1+ WBC COMMENTS TOXIC GRANULATION    
METABOLIC PANEL, COMPREHENSIVE Collection Time: 04/10/19  7:20 PM  
Result Value Ref Range Sodium 136 136 - 145 mmol/L Potassium 4.0 3.5 - 5.1 mmol/L Chloride 102 97 - 108 mmol/L  
 CO2 30 21 - 32 mmol/L Anion gap 4 (L) 5 - 15 mmol/L Glucose 143 (H) 65 - 100 mg/dL BUN 25 (H) 6 - 20 MG/DL Creatinine 1.00 0.55 - 1.02 MG/DL  
 BUN/Creatinine ratio 25 (H) 12 - 20 GFR est AA >60 >60 ml/min/1.73m2 GFR est non-AA 53 (L) >60 ml/min/1.73m2 Calcium 9.3 8.5 - 10.1 MG/DL Bilirubin, total 0.3 0.2 - 1.0 MG/DL  
 ALT (SGPT) 18 12 - 78 U/L  
 AST (SGOT) 24 15 - 37 U/L Alk. phosphatase 56 45 - 117 U/L Protein, total 7.5 6.4 - 8.2 g/dL Albumin 3.4 (L) 3.5 - 5.0 g/dL Globulin 4.1 (H) 2.0 - 4.0 g/dL A-G Ratio 0.8 (L) 1.1 - 2.2 Radiologic Studies -  
CT HEAD WO CONT Final Result IMPRESSION: No acute intracranial hemorrhage, mass or infarct. Ct Head Wo Cont Result Date: 4/10/2019 IMPRESSION: No acute intracranial hemorrhage, mass or infarct. Medical Decision Making I am the first provider for this patient. I reviewed the vital signs, available nursing notes, past medical history, past surgical history, family history and social history. Vital Signs-Reviewed the patient's vital signs. Patient Vitals for the past 12 hrs: 
 Temp Pulse Resp BP SpO2  
04/10/19 2100  (!) 58 16 106/42 96 % 04/10/19 2030  78 15 154/60 97 % 04/10/19 1902 97.8 °F (36.6 °C) 65 20 162/72   
04/10/19 1900  79 19 162/72 97 % Pulse Oximetry Analysis -97 % on room air Cardiac Monitor:  
Rate: 79 bpm 
Rhythm: Normal Sinus Rhythm ED EKG interpretation: Rhythm: normal sinus rhythm; and regular . Rate (approx.): 62; Axis: normal; P wave: normal; QRS interval: normal ; ST/T wave: normal; Other findings: normal. This EKG was interpreted by WILTON Celaya MD,ED Provider. Records Reviewed: Nursing Notes Provider Notes (Medical Decision Making):  
Ddx: vertigo, no localizing neurologic sx on exam, plan for basic labs, CT head, nausea medication. If sx not improving will require admission for further workup ED Course:  
Initial assessment performed. The patients presenting problems have been discussed, and they are in agreement with the care plan formulated and outlined with them. I have encouraged them to ask questions as they arise throughout their visit. ED Course as of Apr 11 2228 Wed Apr 10, 2019  
2319 Spoke with tele-neuro ([id number]) - recommends CTA head and neck, re-eval for dysmetria, re-page if would like eval over tele  
 [KIRIT] ED Course User Index Cierra Everett MD  
23:20 Patient signed out to Dr. Benjy Sy, plan for admission pending CTA Valorie Mejia MD 
 
CONSULT NOTE:  
 
Dr kitchen spoke with Dr Olinda Delgadillo Specialty: Neurology Discussed pt's hx, disposition, and available diagnostic and imaging results. Reviewed care plans. Consultant agrees with plans as outlined. We reviewed the CTA which did not show obvious LVO however, she noted on her exam patient was unable to sit up without falling to the left. Pt endorsed along with son that this was new. She wants her admitted, give baby aspirin if passes PO challenge and provide IVF at 75 cc /hr. 
Written by Cristy Reyes MD 
 
CONSULT NOTE:  
2:16 AM 
Dr Benjy Sy spoke with Dr Nora Beach Specialty: Hospitalist 
Discussed pt's hx, disposition, and available diagnostic and imaging results. Reviewed care plans. Consultant will evaluate pt for admission. Written by Cristy Reyes MD 
 
 
Critical Care: 
None Disposition: 
ADMIT Diagnosis Clinical Impression: 1. Ataxia This note will not be viewable in [address]

## 2019-04-11 NOTE — ED NOTES
Patient states that she is feeling much better. Patient much more active, smiling, and alert. No vomiting noted in several hours. Placed on purewick, remains on cardiac monitor. Family at bedside, call bell within reach. Patient has no complaints at this time.

## 2019-04-11 NOTE — PROGRESS NOTES
Speech pathology Orders received, chart reviewed. Patient in with dizziness, nausea and vomiting. Per PT note, patient's family report slurred speech when waking from naps. Patient passed STAND and is on a regular diet/ thin liquids. MRI completed and WFL. Formal speech/swallow evaluation not indicated at this time. Will complete orders. Thanks, Talon Marroquin M.S. CCC-SLP

## 2019-04-12 ENCOUNTER — APPOINTMENT (OUTPATIENT)
Dept: NON INVASIVE DIAGNOSTICS | Age: 84
DRG: 149 | End: 2019-04-12
Attending: HOSPITALIST
Payer: MEDICARE

## 2019-04-12 LAB
ANION GAP SERPL CALC-SCNC: 5 MMOL/L (ref 5–15)
BUN SERPL-MCNC: 12 MG/DL (ref 6–20)
BUN/CREAT SERPL: 19 (ref 12–20)
CALCIUM SERPL-MCNC: 8.4 MG/DL (ref 8.5–10.1)
CHLORIDE SERPL-SCNC: 108 MMOL/L (ref 97–108)
CHOLEST SERPL-MCNC: 191 MG/DL
CO2 SERPL-SCNC: 27 MMOL/L (ref 21–32)
CREAT SERPL-MCNC: 0.64 MG/DL (ref 0.55–1.02)
ECHO AO ROOT DIAM: 3.21 CM
ECHO AV AREA PEAK VELOCITY: 1.5 CM2
ECHO AV AREA VTI: 2 CM2
ECHO AV AREA/BSA PEAK VELOCITY: 0.8 CM2/M2
ECHO AV AREA/BSA VTI: 1.1 CM2/M2
ECHO AV MEAN GRADIENT: 9.6 MMHG
ECHO AV PEAK GRADIENT: 15.5 MMHG
ECHO AV PEAK VELOCITY: 197.01 CM/S
ECHO AV VTI: 50.23 CM
ECHO EST RA PRESSURE: 10 MMHG
ECHO LV INTERNAL DIMENSION DIASTOLIC: 3.69 CM (ref 3.9–5.3)
ECHO LV INTERNAL DIMENSION SYSTOLIC: 2.74 CM
ECHO LV IVSD: 1.14 CM (ref 0.6–0.9)
ECHO LV MASS 2D: 142.2 G (ref 67–162)
ECHO LV MASS INDEX 2D: 79.8 G/M2 (ref 43–95)
ECHO LV POSTERIOR WALL DIASTOLIC: 1.02 CM (ref 0.6–0.9)
ECHO LVOT DIAM: 2.04 CM
ECHO LVOT PEAK GRADIENT: 3.1 MMHG
ECHO LVOT PEAK VELOCITY: 88.09 CM/S
ECHO LVOT SV: 98.3 ML
ECHO LVOT VTI: 30.16 CM
ECHO MV A VELOCITY: 146.31 CM/S
ECHO MV AREA PHT: 3.1 CM2
ECHO MV AREA VTI: 2.2 CM2
ECHO MV E DECELERATION TIME (DT): 268.8 MS
ECHO MV E VELOCITY: 111.98 CM/S
ECHO MV E/A RATIO: 0.77
ECHO MV MAX VELOCITY: 142.91 CM/S
ECHO MV MEAN GRADIENT: 3.2 MMHG
ECHO MV PEAK GRADIENT: 8.2 MMHG
ECHO MV PRESSURE HALF TIME (PHT): 72 MS
ECHO MV REGURGITANT PEAK GRADIENT: 107.5 MMHG
ECHO MV REGURGITANT PEAK VELOCITY: 518.36 CM/S
ECHO MV VTI: 45.12 CM
ECHO PULMONARY ARTERY SYSTOLIC PRESSURE (PASP): 34.4 MMHG
ECHO PV MAX VELOCITY: 112.79 CM/S
ECHO PV PEAK GRADIENT: 5.1 MMHG
ECHO RIGHT VENTRICULAR SYSTOLIC PRESSURE (RVSP): 34.4 MMHG
ECHO RV INTERNAL DIMENSION: 2.91 CM
ECHO TV REGURGITANT MAX VELOCITY: 247.21 CM/S
ECHO TV REGURGITANT PEAK GRADIENT: 24.4 MMHG
ERYTHROCYTE [DISTWIDTH] IN BLOOD BY AUTOMATED COUNT: 19.8 % (ref 11.5–14.5)
EST. AVERAGE GLUCOSE BLD GHB EST-MCNC: 108 MG/DL
GLUCOSE BLD STRIP.AUTO-MCNC: 86 MG/DL (ref 65–100)
GLUCOSE BLD STRIP.AUTO-MCNC: 89 MG/DL (ref 65–100)
GLUCOSE BLD STRIP.AUTO-MCNC: 91 MG/DL (ref 65–100)
GLUCOSE BLD STRIP.AUTO-MCNC: 99 MG/DL (ref 65–100)
GLUCOSE SERPL-MCNC: 70 MG/DL (ref 65–100)
HBA1C MFR BLD: 5.4 % (ref 4.2–6.3)
HCT VFR BLD AUTO: 29.1 % (ref 35–47)
HDLC SERPL-MCNC: 83 MG/DL
HDLC SERPL: 2.3 {RATIO} (ref 0–5)
HGB BLD-MCNC: 9.1 G/DL (ref 11.5–16)
LDLC SERPL CALC-MCNC: 94.2 MG/DL (ref 0–100)
LIPID PROFILE,FLP: NORMAL
MCH RBC QN AUTO: 27 PG (ref 26–34)
MCHC RBC AUTO-ENTMCNC: 31.3 G/DL (ref 30–36.5)
MCV RBC AUTO: 86.4 FL (ref 80–99)
NRBC # BLD: 0 K/UL (ref 0–0.01)
NRBC BLD-RTO: 0 PER 100 WBC
PLATELET # BLD AUTO: 181 K/UL (ref 150–400)
PMV BLD AUTO: 10.6 FL (ref 8.9–12.9)
POTASSIUM SERPL-SCNC: 3.6 MMOL/L (ref 3.5–5.1)
RBC # BLD AUTO: 3.37 M/UL (ref 3.8–5.2)
SERVICE CMNT-IMP: NORMAL
SODIUM SERPL-SCNC: 140 MMOL/L (ref 136–145)
TRIGL SERPL-MCNC: 69 MG/DL (ref ?–150)
VLDLC SERPL CALC-MCNC: 13.8 MG/DL
WBC # BLD AUTO: 6.2 K/UL (ref 3.6–11)

## 2019-04-12 PROCEDURE — 74011250637 HC RX REV CODE- 250/637: Performed by: HOSPITALIST

## 2019-04-12 PROCEDURE — 80061 LIPID PANEL: CPT

## 2019-04-12 PROCEDURE — 36415 COLL VENOUS BLD VENIPUNCTURE: CPT

## 2019-04-12 PROCEDURE — 82962 GLUCOSE BLOOD TEST: CPT

## 2019-04-12 PROCEDURE — 83036 HEMOGLOBIN GLYCOSYLATED A1C: CPT

## 2019-04-12 PROCEDURE — 97116 GAIT TRAINING THERAPY: CPT

## 2019-04-12 PROCEDURE — 80048 BASIC METABOLIC PNL TOTAL CA: CPT

## 2019-04-12 PROCEDURE — 74011636637 HC RX REV CODE- 636/637: Performed by: HOSPITALIST

## 2019-04-12 PROCEDURE — 85027 COMPLETE CBC AUTOMATED: CPT

## 2019-04-12 PROCEDURE — 74011250636 HC RX REV CODE- 250/636: Performed by: PSYCHIATRY & NEUROLOGY

## 2019-04-12 PROCEDURE — 93306 TTE W/DOPPLER COMPLETE: CPT

## 2019-04-12 PROCEDURE — 97535 SELF CARE MNGMENT TRAINING: CPT

## 2019-04-12 PROCEDURE — 97530 THERAPEUTIC ACTIVITIES: CPT

## 2019-04-12 PROCEDURE — 65660000000 HC RM CCU STEPDOWN

## 2019-04-12 RX ORDER — AMLODIPINE BESYLATE 5 MG/1
5 TABLET ORAL DAILY
Status: DISCONTINUED | OUTPATIENT
Start: 2019-04-12 | End: 2019-04-13 | Stop reason: HOSPADM

## 2019-04-12 RX ADMIN — PREDNISONE 2.5 MG: 5 TABLET ORAL at 09:27

## 2019-04-12 RX ADMIN — OYSTER SHELL CALCIUM WITH VITAMIN D 2 TABLET: 500; 200 TABLET, FILM COATED ORAL at 17:49

## 2019-04-12 RX ADMIN — MECLIZINE 12.5 MG: 12.5 TABLET ORAL at 21:17

## 2019-04-12 RX ADMIN — AMLODIPINE BESYLATE 5 MG: 5 TABLET ORAL at 09:27

## 2019-04-12 RX ADMIN — OXYCODONE HYDROCHLORIDE AND ACETAMINOPHEN 500 MG: 500 TABLET ORAL at 09:23

## 2019-04-12 RX ADMIN — Medication 10 ML: at 09:28

## 2019-04-12 RX ADMIN — Medication 10 ML: at 14:01

## 2019-04-12 RX ADMIN — MECLIZINE 12.5 MG: 12.5 TABLET ORAL at 17:49

## 2019-04-12 RX ADMIN — PANTOPRAZOLE SODIUM 40 MG: 40 TABLET, DELAYED RELEASE ORAL at 09:23

## 2019-04-12 RX ADMIN — FERROUS SULFATE TAB 325 MG (65 MG ELEMENTAL FE) 325 MG: 325 (65 FE) TAB at 09:23

## 2019-04-12 RX ADMIN — ASPIRIN 81 MG: 81 TABLET, COATED ORAL at 09:23

## 2019-04-12 RX ADMIN — Medication 10 ML: at 17:52

## 2019-04-12 RX ADMIN — FOLIC ACID 1 MG: 1 TABLET ORAL at 09:23

## 2019-04-12 RX ADMIN — Medication 10 ML: at 05:05

## 2019-04-12 RX ADMIN — MULTIPLE VITAMINS W/ MINERALS TAB 1 TABLET: TAB at 09:23

## 2019-04-12 RX ADMIN — OYSTER SHELL CALCIUM WITH VITAMIN D 2 TABLET: 500; 200 TABLET, FILM COATED ORAL at 09:23

## 2019-04-12 RX ADMIN — MECLIZINE 12.5 MG: 12.5 TABLET ORAL at 09:24

## 2019-04-12 NOTE — ROUTINE PROCESS
No surgery found * 
* No surgery found * Bedside shift change report given to Franklin Clarke RN(oncoming nurse) by Su SOTO (offgoing nurse). Report included the following information SBAR and Kardex. 
  
Zone Phone:   [phone] 
  
  
Significant changes during shift:  NONE 
  
  
  
Patient Information 
  
Deanna Suresh 80 y.o. 
4/10/2019  6:41 PM by Anamaria Espinosa admitted from Home 
  
Problem List 
  
       
Patient Active Problem List  
  Diagnosis Date Noted  Dizziness 04/11/2019  Ataxia 04/11/2019  Nausea and vomiting 04/11/2019  
  
       
Past Medical History:  
Diagnosis Date  Cancer ([facility] [address] 08/2004  
  lymphoma  Nausea & vomiting    
 Rheumatoid arthritis(714.0)    
  
  
  
Core Measures: 
  
CVA: No Yes CHF:No No 
PNA:No No 
  
Post Op Surgical (If Applicable):  
  
Number times ambulated in hallway past shift:  0 
Number of times OOB to chair past shift:   0 
NG Tube: No 
Incentive Spirometer: No 
Drains: No   Volume  0 Dressing Present:  No 
Flatus:  No 
  
Activity Status: 
  
OOB to [employer] Ambulated this shift yes Bed Rest no 
  
Supplemental V5: (RZ Applicable) 
  
NC No 
NRB No 
Venti-mask No 
On 0 Liters/min 
  
  
LINES AND DRAINS: 
  
Central Line? No  
  
  
PICC LINE? No  
  
Urinary Catheter? No  
  
  
DVT prophylaxis: 
  
DVT prophylaxis Med- Yes DVT prophylaxis SCD or PHUC- Yes  
  
Wounds: (If Applicable) 
  
Wounds- No 
  
Location 0 
  
Patient Safety: 
  
Falls Score Total Score: 2 Safety Level_______ Bed Alarm On? Yes Sitter? No 
  
Plan for upcoming shift:  NEURO CHECK 
  
  
  
Discharge Plan: No 0 
  
Active Consults: 
IP CONSULT TO NEUROLOGY

## 2019-04-12 NOTE — PROGRESS NOTES
Reason for Admission:   Nausea and Vomiting RRAT Score:        8 Plan for utilizing home health:      Yes after rehab Current Advanced Directive/Advance Care Plan: Not on file. Likelihood of Readmission:  Low Transition of Care Plan:      IP rehab. Care Management Interventions PCP Verified by CM: Yes Mode of Transport at Discharge: Self Transition of Care Consult (CM Consult): (IP rehab) Physical Therapy Consult: Yes Occupational Therapy Consult: Yes Speech Therapy Consult: Yes Current Support Network: Lives Alone(Single story home has noo steps- family lives next door.) Confirm Follow Up Transport: Family Plan discussed with Pt/Family/Caregiver: Yes Freedom of Choice Offered: Yes Discharge Location Discharge Placement: Rehab hospital/unit acute. t Discussed d/cplan with pt. Referral send to [facility] via [facility]. Grandson and daughter were present at bedside. Yola Hawley MSW 
ED Case Manager Ext -[phone]

## 2019-04-12 NOTE — PROGRESS NOTES
Problem: Self Care Deficits Care Plan (Adult) Goal: *Acute Goals and Plan of Care (Insert Text) Description Occupational Therapy Goals: 
Initiated 4/11/2019 1. Patient will perform grooming standing with contact guard assist within 7 days. 2. Patient will perform donning of underwear and pants with minimal assistance within 7 days. 3. Patient will perform toileting with minimal assistance within 7 days. 4. Patient will perform bathing with minimal assist within 7 days. 5. Patient will improve dynamic sitting balance to supervision within 7 days. 6. Patient will transfer from toilet with contact guard assist using the least restrictive device and appropriate durable medical equipment within 7 days. Outcome: Progressing Towards Goal 
 OCCUPATIONAL THERAPY TREATMENT Patient: Sancho Martins (17 y.o. female) Date: 4/12/2019 Diagnosis: Nausea and vomiting [R11.2] Dizziness [R42] Ataxia [R27.0] <principal problem not specified> Precautions:   
Chart, occupational therapy assessment, plan of care, and goals were reviewed. ASSESSMENT: 
Pt was cleared to be seen and was supine in bed and all VSS. Pt was CGA for bed mobility and extra time. She was able to stand with min assist of 2 and once she started to walk she was loosing her balance and stated that she uses a loft strand walker at home due to her RA. Pt was listing to the left and she was min assist to regain her balance. Pt needed max assist to miguel angel her shoes and she does better with her shoes on due her leg length discrepancy . Pt was able to transfer to and from toilet with min assist of 1 and recommend that pt have gait belt on with nursing and  family stated that he will bring in her walker. Pt was left sitting up in chair and family in room . Recommend that pt have further therapy at discharge at In pt rehab. Progression toward goals: 
?       Improving appropriately and progressing toward goals ?       Improving slowly and progressing toward goals ? Not making progress toward goals and plan of care will be adjusted PLAN: 
Patient continues to benefit from skilled intervention to address the above impairments. Continue treatment per established plan of care. Discharge Recommendations:  Inpatient Rehab Further Equipment Recommendations for Discharge:  tbd SUBJECTIVE:  
Patient stated ? I want to get better. ? OBJECTIVE DATA SUMMARY:  
Cognitive/Behavioral Status: 
Neurologic State: Alert Orientation Level: Appropriate for age Cognition: Appropriate decision making Perception: Appears intact Perseveration: No perseveration noted Safety/Judgement: Awareness of environment Functional Mobility and Transfers for ADLs: 
Bed Mobility: 
Supine to Sit: Contact guard assistance; Additional time Scooting: Contact guard assistance; Additional time Transfers: 
Sit to Stand: Minimum assistance; Additional time(inconsistent) Bed to Chair: Minimum assistance Balance: 
Sitting: Impaired Sitting - Static: Good (unsupported) Sitting - Dynamic: Fair (occasional) Standing: Impaired Standing - Static: Fair Standing - Dynamic : Poor ADL Intervention: 
  
 
 Pt is setup for grooming and UB ADLs seated and max for LB Toileting Toileting Assistance: Minimum assistance Bladder Hygiene: Minimum assistance Bowel Hygiene: Minimum assistance Cognitive Retraining Safety/Judgement: Awareness of environment Pain: 
Pain Scale 1: Numeric (0 - 10) Pain Intensity 1: 0 Activity Tolerance:  
good Please refer to the flowsheet for vital signs taken during this treatment. After treatment:  
? Patient left in no apparent distress sitting up in chair ? Patient left in no apparent distress in bed 
? Call bell left within reach ? Nursing notified ? Caregiver present ? Bed alarm activated COMMUNICATION/COLLABORATION:  
 The patient?s plan of care was discussed with: Physical Therapist, Registered Nurse and family CATRACHITO Heard Time Calculation: 35 mins

## 2019-04-12 NOTE — PROGRESS NOTES
Problem: Mobility Impaired (Adult and Pediatric) Goal: *Acute Goals and Plan of Care (Insert Text) Description Physical Therapy Goals Initiated 4/11/2019 1. Patient will move from supine to sit and sit to supine , scoot up and down and roll side to side in bed with minimal assistance/contact guard assist within 7 day(s). 2.  Patient will transfer from bed to chair and chair to bed with minimal assistance/contact guard assist using the least restrictive device within 7 day(s). 3.  Patient will perform sit to stand with minimal assistance/contact guard assist within 7 day(s). 4.  Patient will ambulate with minimal assistance/contact guard assist for 75 feet with the least restrictive device within 7 day(s). Outcome: Progressing Towards Goal 
 PHYSICAL THERAPY TREATMENT Patient: Griselda Anderson (63 y.o. female) Date: 4/12/2019 Diagnosis: Nausea and vomiting [R11.2] Dizziness [R42] Ataxia [R27.0] <principal problem not specified> Precautions:   
Chart, physical therapy assessment, plan of care and goals were reviewed. ASSESSMENT: 
Pt received supine in bed with family present and agreeable to PT intervention. Pt cleared by nursing for mobility. Pt with good progress towards therapy goals compared to prior therapy session, however continues to exhibit impaired balance, generalized weakness, and impaired gait mechanics. Bed mobility performed with CGA and increased time. Sitting balance inconsistent as patient with occasional posterior lean. Transfers performed with min A x 1-2, needing increased assistance to weight shift forward and achieve standing. Attempted to initiate gait training with RW, however patient unable to appropriately use RW due to limited hand/finger ROM. Reports that she has RW with platform attachments at home.  She ambulated 76' without AD, however required min A x 2 and occasional HHA vs use of wall railing for support due to impaired balance and frequent balance checks and LOBs. She did experience moderate LOB x 3, requiring min-mod A x 1-2 to recover, during gait training. Exhibits slow, shuffled gait with narrowed RODNEY and frequent posterior LOB/lean throughout ambulation. Patient did experience intermittent lightheadedness during activity with VSS throughout positional changes and no nystagmus noted with activity. Symptoms improved overall compared to prior therapy session. Pt was left sitting in bedside chair with all needs met, RN aware, and family present following therapy session. Recommend patient discharge to inpatient rehab to improve functional mobility and independence prior to returning home as patient with increased risk for falls and with decline from baseline mobility. Progression toward goals: 
?    Improving appropriately and progressing toward goals ? Improving slowly and progressing toward goals ? Not making progress toward goals and plan of care will be adjusted PLAN: 
Patient continues to benefit from skilled intervention to address the above impairments. Continue treatment per established plan of care. Discharge Recommendations:  Inpatient Rehab Further Equipment Recommendations for Discharge:  TBD by rehab SUBJECTIVE:  
Patient stated I do feel a little lightheaded.  OBJECTIVE DATA SUMMARY:  
Critical Behavior: 
Neurologic State: Alert Orientation Level: Oriented X4 Cognition: Appropriate safety awareness, Follows commands Safety/Judgement: Awareness of environment, Fall prevention, Home safety Functional Mobility Training: 
Bed Mobility: 
  
Supine to Sit: Contact guard assistance; Additional time Scooting: Contact guard assistance; Additional time Transfers: 
Sit to Stand: Minimum assistance; Additional time(inconsistent) Stand to Sit: Minimum assistance(uncontrolled descent into sitting) Bed to Chair: Minimum assistance Balance: 
Sitting: Impaired Sitting - Static: Good (unsupported) Sitting - Dynamic: Fair (occasional) Standing: Impaired Standing - Static: Fair Standing - Dynamic : Poor Ambulation/Gait Training: 
Distance (ft): 75 Feet (ft) Assistive Device: Gait belt(intermittent HHA) Ambulation - Level of Assistance: Minimal assistance;Assist x2 Gait Abnormalities: Decreased step clearance; Path deviations; Shuffling gait; Step to gait(L lateral deviation/LOB) Base of Support: Narrowed; Center of gravity altered Speed/Margaux: Slow;Shuffled Step Length: Left shortened;Right shortened Pain: 
Pain Scale 1: Numeric (0 - 10) Pain Intensity 1: 0 Activity Tolerance:  
Improving - decreased symptoms; VSS throughout positional changes; c/o dizziness and lightheadedness with positional changes and mobility Please refer to the flowsheet for vital signs taken during this treatment. After treatment:  
?    Patient left in no apparent distress sitting up in chair ? Patient left in no apparent distress in bed 
? Call bell left within reach ? Nursing notified ? Caregiver present ? Bed alarm activated COMMUNICATION/COLLABORATION:  
The patients plan of care was discussed with: Physical Therapist, Occupational Therapist and Registered Nurse Yamileth Holt PT, DPT Time Calculation: 32 mins

## 2019-04-12 NOTE — PROGRESS NOTES
Hospitalist Progress Note NAME: Nilson Melo :  1935 MRN:  [id number] Assessment / Plan: 
 
 
Dizziness, truncal ataxia  
with leaning towards left 
nausea and vomiting 
-MRI neg, like peripheral reason for above symptoms 
-CT head and CTA head and neck negative. 
-start empiric coated aspirin. If MRI negative, will discontinue since she has had gastritis and LGIB. -empiric meclizine prn 
-pt/ot consult. Neurology consult, echo, A1c, lipid panel 
  
HTN 
-restart amlodipine PTA medication 
  
Severe RA 
--continue prednisone 2.5mg daily and MTX and folic acid 
  
Gastritis Hx LGIB Anemia 
-continue iron. Had EGD/colonoscopy a few months ago per patient. 
-continue PPI 
  
  
Code: full DVT prophylaxis:  SCD Surrogate decision maker:  Grant Floyd Subjective: Chief Complaint / Reason for Physician Visit \"I am feeling better\". Discussed with RN events overnight. Review of Systems: 
Symptom Y/N Comments  Symptom Y/N Comments Fever/Chills n   Chest Pain n   
Poor Appetite    Edema Cough n   Abdominal Pain n   
Sputum n   Joint Pain n   
SOB/MCCONNELL n   Pruritis/Rash n   
Nausea/vomit n   Tolerating PT/OT y Diarrhea    Tolerating Diet y Constipation    Other Could NOT obtain due to:   
 
Objective: VITALS:  
Last 24hrs VS reviewed since prior progress note. Most recent are: 
Patient Vitals for the past 24 hrs: 
 Temp Pulse Resp BP SpO2  
19 1145 98 °F (36.7 °C) 66 18 126/58 98 % 19 0743 98.2 °F (36.8 °C) 63 18 143/51 97 % 19 0341     96 % 19 0339 98.2 °F (36.8 °C) 68 18 146/57 96 % 19 2335 97.7 °F (36.5 °C) 67 18 147/69 98 % 19 2005 98 °F (36.7 °C) 67 16 146/55 98 % 19 1519 97.8 °F (36.6 °C) 63 16 140/68 98 % Intake/Output Summary (Last 24 hours) at 2019 1402 Last data filed at 2019 1401 Gross per 24 hour Intake 1455 ml Output 900 ml Net 555 ml PHYSICAL EXAM: 
 General: WD, WN. Alert, cooperative, no acute distress   
EENT:  EOMI. Anicteric sclerae. MMM Resp:  CTA bilaterally, no wheezing or rales. No accessory muscle use CV:  Regular  rhythm,  No edema GI:  Soft, Non distended, Non tender.  +Bowel sounds Neurologic:  Alert and oriented X 3, normal speech, Psych:   Good insight. Not anxious nor agitated Skin:  No rashes. No jaundice Reviewed most current lab test results and cultures  YES Reviewed most current radiology test results   YES Review and summation of old records today    NO Reviewed patient's current orders and MAR    YES 
PMH/SH reviewed - no change compared to H&P 
________________________________________________________________________ Care Plan discussed with: 
  Comments Patient x Family  x   
RN x Care Manager x Consultant Multidiciplinary team rounds were held today with , nursing, pharmacist and clinical coordinator. Patient's plan of care was discussed; medications were reviewed and discharge planning was addressed. ________________________________________________________________________ Total NON critical care TIME:  35   Minutes Total CRITICAL CARE TIME Spent:   Minutes non procedure based Comments >50% of visit spent in counseling and coordination of care    
________________________________________________________________________ Marguerite Rod MD  
 
Procedures: see electronic medical records for all procedures/Xrays and details which were not copied into this note but were reviewed prior to creation of Plan. LABS: 
I reviewed today's most current labs and imaging studies. Pertinent labs include: 
Recent Labs 04/12/19 
0414 04/10/19 
1920 WBC 6.2 10.2 HGB 9.1* 10.3* HCT 29.1* 32.3*  
 212 Recent Labs 04/12/19 
0414 04/10/19 
1920  136  
K 3.6 4.0  
 102 CO2 27 30 GLU 70 143* BUN 12 25* CREA 0.64 1.00  
CA 8.4* 9.3 ALB  --  3.4* TBILI  --  0.3 SGOT  --  24 ALT  --  18 Signed: Shane Holm MD

## 2019-04-12 NOTE — ROUTINE PROCESS
Casie Manning Registered Nurse NURSING Progress Notes Signed Date of Service:  04/12/19 8714 []Carla copied text []Heydi for details * No surgery found * 
* No surgery found * Bedside shift change report given to  [facility] by Yvonne Barragan (offgoing nurse). Report included the following information SBAR and Kardex. 
  
Zone Phone:   [phone] 
  
  
Significant changes during shift:   
  
  
  
Patient Information 
  
Cele Alicia 80 y.o. 
4/10/2019  6:41 PM by Zora Kelly MD. Cele Alicia was admitted from Home 
  
Problem List 
  
    
Patient Active Problem List  
  Diagnosis Date Noted  Dizziness 04/11/2019  Ataxia 04/11/2019  Nausea and vomiting 04/11/2019  
  
    
Past Medical History:  
Diagnosis Date  Cancer ([facility] [address] 08/2004  
  lymphoma  Nausea & vomiting    
 Rheumatoid arthritis(714.0)    
  
  
  
Core Measures: 
  
CVA: No Yes CHF:No No 
PNA:No No 
  
Post Op Surgical (If Applicable):  
  
Number times ambulated in hallway past shift:  0 Number of times OOB to chair past shift:   0 
NG Tube: No 
Incentive Spirometer: No 
Drains: No   Volume  0 Dressing Present:  No 
Flatus:  No 
  
Activity Status: 
  
OOB to Chair yes Ambulated this shift yes Bed Rest no 
  
Supplemental O2: (If Applicable) 
  
NC No 
NRB No 
Venti-mask No 
On 0 Liters/min 
  
  
LINES AND DRAINS: 
  
Central Line? No Placement date 0 Reason Medically Necessary 0 
  
  
PICC LINE? No Placement date 0Reason Medically Necessary 0 
  
Urinary Catheter? No Placement Date 0 Reason Medically Necessary 0 
  
  
DVT prophylaxis: 
  
DVT prophylaxis Med- Yes DVT prophylaxis SCD or PHUC- Yes  
  
Wounds: (If Applicable) 
  
Wounds- No 
  
Location 0 
  
Patient Safety: 
  
Falls Score Total Score: 2 Safety Level_______ Bed Alarm On? Yes Sitter? No 
  
Plan for upcoming shift: needs PT/OT MRI, ECHO, NEURO CONSULT 
  
  
  
Discharge Plan: No 0 
  
Active Consults: IP CONSULT TO NEUROLOGY

## 2019-04-13 ENCOUNTER — HOSPITAL ENCOUNTER (OUTPATIENT)
Dept: REHABILITATION | Age: 84
End: 2019-04-20
Attending: PHYSICAL MEDICINE & REHABILITATION | Admitting: PHYSICAL MEDICINE & REHABILITATION

## 2019-04-13 VITALS
TEMPERATURE: 97.9 F | DIASTOLIC BLOOD PRESSURE: 61 MMHG | WEIGHT: 165.13 LBS | RESPIRATION RATE: 18 BRPM | HEIGHT: 63 IN | BODY MASS INDEX: 29.26 KG/M2 | OXYGEN SATURATION: 99 % | SYSTOLIC BLOOD PRESSURE: 135 MMHG | HEART RATE: 68 BPM

## 2019-04-13 LAB
GLUCOSE BLD STRIP.AUTO-MCNC: 108 MG/DL (ref 65–100)
GLUCOSE BLD STRIP.AUTO-MCNC: 92 MG/DL (ref 65–100)
SERVICE CMNT-IMP: ABNORMAL
SERVICE CMNT-IMP: NORMAL

## 2019-04-13 PROCEDURE — 87086 URINE CULTURE/COLONY COUNT: CPT

## 2019-04-13 PROCEDURE — 74011250636 HC RX REV CODE- 250/636: Performed by: PSYCHIATRY & NEUROLOGY

## 2019-04-13 PROCEDURE — 74011250637 HC RX REV CODE- 250/637: Performed by: HOSPITALIST

## 2019-04-13 PROCEDURE — 74011636637 HC RX REV CODE- 636/637: Performed by: HOSPITALIST

## 2019-04-13 PROCEDURE — 87077 CULTURE AEROBIC IDENTIFY: CPT

## 2019-04-13 PROCEDURE — 87186 SC STD MICRODIL/AGAR DIL: CPT

## 2019-04-13 PROCEDURE — 82962 GLUCOSE BLOOD TEST: CPT

## 2019-04-13 RX ADMIN — ASPIRIN 81 MG: 81 TABLET, COATED ORAL at 08:40

## 2019-04-13 RX ADMIN — MECLIZINE 12.5 MG: 12.5 TABLET ORAL at 08:39

## 2019-04-13 RX ADMIN — FERROUS SULFATE TAB 325 MG (65 MG ELEMENTAL FE) 325 MG: 325 (65 FE) TAB at 08:39

## 2019-04-13 RX ADMIN — PREDNISONE 2.5 MG: 5 TABLET ORAL at 08:40

## 2019-04-13 RX ADMIN — OXYCODONE HYDROCHLORIDE AND ACETAMINOPHEN 500 MG: 500 TABLET ORAL at 08:40

## 2019-04-13 RX ADMIN — PANTOPRAZOLE SODIUM 40 MG: 40 TABLET, DELAYED RELEASE ORAL at 08:40

## 2019-04-13 RX ADMIN — FOLIC ACID 1 MG: 1 TABLET ORAL at 08:40

## 2019-04-13 RX ADMIN — OYSTER SHELL CALCIUM WITH VITAMIN D 2 TABLET: 500; 200 TABLET, FILM COATED ORAL at 08:40

## 2019-04-13 RX ADMIN — Medication 10 ML: at 05:32

## 2019-04-13 RX ADMIN — AMLODIPINE BESYLATE 5 MG: 5 TABLET ORAL at 08:39

## 2019-04-13 RX ADMIN — MULTIPLE VITAMINS W/ MINERALS TAB 1 TABLET: TAB at 08:40

## 2019-04-13 NOTE — DISCHARGE SUMMARY
Hospitalist Discharge Summary     Patient ID:  Lyndsay Torres  [id number]  21 y.o.  1935    PCP on record: Mariela Cordon MD    Admit date: 4/10/2019  Discharge date and time: 4/13/2019      DISCHARGE DIAGNOSIS:      Dizziness, truncal ataxia   nausea and vomiting  HTN  Severe RA  Gastritis  Hx LGIB  Anemia      CONSULTATIONS:  IP CONSULT TO NEUROLOGY    Excerpted HPI from H&P of Savannah Fitzpatrick MD:  Lyndsay Torres is a 80 y.o.  female with severe RA, HTN onset 5pm with dizziness, gait instability, having to hold onto wall. Soon followed with severe nausea and vomiting, generalized weakness. Dizziness better if close eyes. Denies headache, focal weakness.     Dizziness better now but still dizzy when sit up. No CP, SOB, abdominal pain, diarrhea. Denies any bleeding, stool always dark. We were asked to admit for work up and evaluation of the above problems. ______________________________________________________________________  DISCHARGE SUMMARY/HOSPITAL COURSE:  for full details see H&P, daily progress notes, labs, consult notes. Dizziness, truncal ataxia leaning towards left with nausea and vomiting  -MRI neg, like peripheral reason for above symptoms  -CT head and CTA head and neck negative. -MRI neg for stroke, PT/OT recommended inpat rehab  -[facility]     HTN  -restarted amlodipine PTA medication     Severe RA  -continue prednisone 2.5mg daily and MTX and folic acid     Gastritis  Hx LGIB  Anemia  -continue iron.  Had EGD/colonoscopy a few months ago per patient.  -continue PPI     Code: full  Surrogate decision maker:  Grant Abarca    _______________________________________________________________________  Patient seen and examined by me on discharge day. Pertinent Findings:  Gen:    Not in distress  Chest: Clear lungs  CVS:   Regular rhythm.   No edema  Abd:  Soft, not distended, not tender  Neuro:  Alert, cn 2-12 grossly intact  _______________________________________________________________________  DISCHARGE MEDICATIONS:   Current Discharge Medication List      CONTINUE these medications which have NOT CHANGED    Details   predniSONE (DELTASONE) 2.5 mg tablet Take 2.5 mg by mouth daily. methotrexate (RHEUMATREX) 2.5 mg tablet Take 12.5 mg by mouth Every Saturday. omeprazole magnesium (PRILOSEC PO) Take  by mouth daily. denosumab (PROLIA) 60 mg/mL injection 60 mg by SubCUTAneous route. amLODIPine (NORVASC) 5 mg tablet Take 5 mg by mouth daily. ferrous sulfate (IRON) 325 mg (65 mg iron) tablet Take 325 mg by mouth Daily (before breakfast). vit a,c & e-lutein-minerals (OCUVITE) tablet Take 1 Tab by mouth daily. folic acid (FOLVITE) 1 mg tablet Take 1 mg by mouth daily. Calcium-Cholecalciferol, D3, (CALCIUM 600 WITH VITAMIN D3) 600 mg(1,500mg) -400 unit Chew Take 1 Tab by mouth two (2) times a day. ascorbic acid (VITAMIN C) 500 mg tablet Take 500 mg by mouth daily. MULTIVITS W-FE,OTHER MIN (CENTRUM PO) Take 1 Tab by mouth daily. My Recommended Diet, Activity, Wound Care, and follow-up labs are listed in the patient's Discharge Insturctions which I have personally completed and reviewed.     ______________________________________________________________________    Risk of deterioration: Moderate    Condition at Discharge:  Stable  ______________________________________________________________________    Disposition  Home with family, no needs  ______________________________________________________________________    Care Plan discussed with:   Patient, Family, RN, Care Manager, Consultant    ______________________________________________________________________    Code Status: Full Code  ______________________________________________________________________      Follow up with:   PCP : Carmita Aguilera MD  Follow-up Information     Follow up With Specialties Details Why Contact Info    [facility]   Patient will be going to rehab when discharged.   [phone]    Una Ortiz MD Internal Medicine   [address] [place] [address]  [phone]                Total time in minutes spent coordinating this discharge (includes going over instructions, follow-up, prescriptions, and preparing report for sign off to her PCP) :  35 minutes    Signed:  Greg Herrera MD

## 2019-04-13 NOTE — PROGRESS NOTES
SW spoke with admissions coordinator for [address], Tuyet Garcia (phone: [phone]). The patient has been accepted to [facility] for admission today. [facility] would like the patient to be sent to them after lunch. The phone number for report is [phone]. CM will continue to follow the patient for dispo needs. Tresa Luna 169, Y[address]

## 2019-04-13 NOTE — ROUTINE PROCESS
Ruma Perez, RN Registered Nurse NURSING Routine Process Signed Date of Service:  04/13/19 8435 []Carla copied text []Heydi for details No surgery found * 
* No surgery found * Bedside shift change report given to Franklin Clarke RN(oncoming nurse) by Su SOTO (offgoing nurse). Report included the following information SBAR and Kardex. 
  
Zone EDEHO:   9667 
  
  
Significant changes during shift:  NONE 
  
  
  
Patient Information 
  
Luna Solis 80 y.o. 
4/10/2019  6:41 PM by Anamaria Titus admitted from Home 
  
Problem List 
  
       
Patient Active Problem List  
  Diagnosis Date Noted  Dizziness 04/11/2019  Ataxia 04/11/2019  Nausea and vomiting 04/11/2019  
  
       
Past Medical History:  
Diagnosis Date  Cancer ([facility] [address] 08/2004  
  lymphoma  Nausea & vomiting    
 Rheumatoid arthritis(714.0)    
  
  
  
Core Measures: 
  
CVA: No Yes CHF:No No 
PNA:No No 
  
Post Op Surgical (If Applicable):  
  
Number times ambulated in hallway past shift:  0 
Number of times OOB to chair past shift:   0 
NG Tube: No 
Incentive Spirometer: No 
Drains: No   Volume  0 Dressing Present:  No 
Flatus:  No 
  
Activity Status: 
  
OOB to [employer] Ambulated this shift yes Bed Rest no 
  
Supplemental P7: (JM Applicable) 
  
NC No 
NRB No 
Venti-mask No 
On 0 Liters/min 
  
  
LINES AND DRAINS: 
  
Central Line? No  
  
  
PICC LINE? No  
  
Urinary Catheter? No  
  
  
DVT prophylaxis: 
  
DVT prophylaxis Med- Yes DVT prophylaxis SCD or PHUC- Yes  
  
Wounds: (If Applicable) 
  
Wounds- No 
  
Location 0 
  
Patient Safety: 
  
Falls Score Total Score: 2 Safety Level_______ Bed Alarm On? Yes Sitter? No 
  
Plan for upcoming shift:  NEURO CHECK 
  
  
  
Discharge Plan: No 0 
  
Active Consults: 
IP CONSULT TO NEUROLOGY

## 2019-04-13 NOTE — DISCHARGE INSTRUCTIONS
Lela Activation    Thank you for requesting access to Lela. Please follow the instructions below to securely access and download your online medical record. Lela allows you to send messages to your doctor, view your test results, renew your prescriptions, schedule appointments, and more. How Do I Sign Up? 1. In your internet browser, go to [email or web address]  2. Click on the First Time User? Click Here link in the Sign In box. You will be redirect to the New Member Sign Up page. 3. Enter your Lela Access Code exactly as it appears below. You will not need to use this code after youve completed the sign-up process. If you do not sign up before the expiration date, you must request a new code. Lela Access Code: [email or web address]  Expires: 2019  6:53 AM (This is the date your Lela access code will )    4. Enter the last four digits of your Social Security Number (xxxx) and Date of Birth (mm/dd/yyyy) as indicated and click Submit. You will be taken to the next sign-up page. 5. Create a Lela ID. This will be your Lela login ID and cannot be changed, so think of one that is secure and easy to remember. 6. Create a Lela password. You can change your password at any time. 7. Enter your Password Reset Question and Answer. This can be used at a later time if you forget your password. 8. Enter your e-mail address. You will receive e-mail notification when new information is available in [address] 9. Click Sign Up. You can now view and download portions of your medical record. 10. Click the Download Summary menu link to download a portable copy of your medical information. Additional Information    If you have questions, please visit the Frequently Asked Questions section of the Lela website at [email or web address] Remember, Lela is NOT to be used for urgent needs. For medical emergencies, dial 911.

## 2019-04-14 LAB
ALBUMIN SERPL-MCNC: 2.9 G/DL (ref 3.5–5)
ALBUMIN/GLOB SERPL: 0.7 {RATIO} (ref 1.1–2.2)
ALP SERPL-CCNC: 51 U/L (ref 45–117)
ALT SERPL-CCNC: 14 U/L (ref 12–78)
ANION GAP SERPL CALC-SCNC: 4 MMOL/L (ref 5–15)
APPEARANCE UR: ABNORMAL
AST SERPL-CCNC: 19 U/L (ref 15–37)
BACTERIA URNS QL MICRO: ABNORMAL /HPF
BASOPHILS # BLD: 0 K/UL (ref 0–0.1)
BASOPHILS NFR BLD: 0 % (ref 0–1)
BILIRUB SERPL-MCNC: 0.3 MG/DL (ref 0.2–1)
BILIRUB UR QL: NEGATIVE
BUN SERPL-MCNC: 20 MG/DL (ref 6–20)
BUN/CREAT SERPL: 26 (ref 12–20)
CALCIUM SERPL-MCNC: 8.7 MG/DL (ref 8.5–10.1)
CHLORIDE SERPL-SCNC: 105 MMOL/L (ref 97–108)
CO2 SERPL-SCNC: 27 MMOL/L (ref 21–32)
COLOR UR: ABNORMAL
CREAT SERPL-MCNC: 0.76 MG/DL (ref 0.55–1.02)
DIFFERENTIAL METHOD BLD: ABNORMAL
EOSINOPHIL # BLD: 0.1 K/UL (ref 0–0.4)
EOSINOPHIL NFR BLD: 1 % (ref 0–7)
EPITH CASTS URNS QL MICRO: ABNORMAL /LPF
ERYTHROCYTE [DISTWIDTH] IN BLOOD BY AUTOMATED COUNT: 19.6 % (ref 11.5–14.5)
GLOBULIN SER CALC-MCNC: 3.9 G/DL (ref 2–4)
GLUCOSE SERPL-MCNC: 90 MG/DL (ref 65–100)
GLUCOSE UR STRIP.AUTO-MCNC: NEGATIVE MG/DL
HCT VFR BLD AUTO: 33.5 % (ref 35–47)
HGB BLD-MCNC: 10.7 G/DL (ref 11.5–16)
HGB UR QL STRIP: ABNORMAL
HYALINE CASTS URNS QL MICRO: ABNORMAL /LPF (ref 0–5)
IMM GRANULOCYTES # BLD AUTO: 0 K/UL (ref 0–0.04)
IMM GRANULOCYTES NFR BLD AUTO: 0 % (ref 0–0.5)
KETONES UR QL STRIP.AUTO: NEGATIVE MG/DL
LEUKOCYTE ESTERASE UR QL STRIP.AUTO: ABNORMAL
LYMPHOCYTES # BLD: 0.5 K/UL (ref 0.8–3.5)
LYMPHOCYTES NFR BLD: 5 % (ref 12–49)
MCH RBC QN AUTO: 27.7 PG (ref 26–34)
MCHC RBC AUTO-ENTMCNC: 31.9 G/DL (ref 30–36.5)
MCV RBC AUTO: 86.8 FL (ref 80–99)
MONOCYTES # BLD: 0.5 K/UL (ref 0–1)
MONOCYTES NFR BLD: 5 % (ref 5–13)
NEUTS SEG # BLD: 8.3 K/UL (ref 1.8–8)
NEUTS SEG NFR BLD: 88 % (ref 32–75)
NITRITE UR QL STRIP.AUTO: NEGATIVE
NRBC # BLD: 0 K/UL (ref 0–0.01)
NRBC BLD-RTO: 0 PER 100 WBC
PH UR STRIP: 6.5 [PH] (ref 5–8)
PLATELET # BLD AUTO: 208 K/UL (ref 150–400)
PMV BLD AUTO: 10.3 FL (ref 8.9–12.9)
POTASSIUM SERPL-SCNC: 3.7 MMOL/L (ref 3.5–5.1)
PROT SERPL-MCNC: 6.8 G/DL (ref 6.4–8.2)
PROT UR STRIP-MCNC: 100 MG/DL
RBC # BLD AUTO: 3.86 M/UL (ref 3.8–5.2)
RBC #/AREA URNS HPF: ABNORMAL /HPF (ref 0–5)
SODIUM SERPL-SCNC: 136 MMOL/L (ref 136–145)
SP GR UR REFRACTOMETRY: 1.01 (ref 1–1.03)
UROBILINOGEN UR QL STRIP.AUTO: 0.2 EU/DL (ref 0.2–1)
WBC # BLD AUTO: 9.5 K/UL (ref 3.6–11)
WBC URNS QL MICRO: >100 /HPF (ref 0–4)

## 2019-04-14 PROCEDURE — 80053 COMPREHEN METABOLIC PANEL: CPT

## 2019-04-14 PROCEDURE — 85025 COMPLETE CBC W/AUTO DIFF WBC: CPT

## 2019-04-14 PROCEDURE — 81001 URINALYSIS AUTO W/SCOPE: CPT

## 2019-04-14 PROCEDURE — 36415 COLL VENOUS BLD VENIPUNCTURE: CPT

## 2019-04-16 LAB
BACTERIA SPEC CULT: ABNORMAL
CC UR VC: ABNORMAL
SERVICE CMNT-IMP: ABNORMAL

## 2023-01-13 NOTE — PROGRESS NOTES
Prescription was sent for both Colace and MiraLax.  It does look like the patient has calcium carbonate (TUMS)  I would recommend they give her those for heartburn.  She is also supposed to be on omeprazole daily, has she been getting that?  If she is having breakthrough heartburn despite taking the omeprazole, then she may need some follow-up.   Spoke with lialaura from [address] [place] [address] and they can accept patient tomorrow if medically stable. FOC signed and placed on chart. Informed Dr Ayah Luna.